# Patient Record
Sex: FEMALE | Race: WHITE | Employment: OTHER | URBAN - METROPOLITAN AREA
[De-identification: names, ages, dates, MRNs, and addresses within clinical notes are randomized per-mention and may not be internally consistent; named-entity substitution may affect disease eponyms.]

---

## 2017-06-02 ENCOUNTER — HOSPITAL ENCOUNTER (EMERGENCY)
Age: 77
Discharge: HOME | End: 2017-06-03
Attending: EMERGENCY MEDICINE
Payer: MEDICARE

## 2017-06-02 DIAGNOSIS — N39.0 URINARY TRACT INFECTION WITHOUT HEMATURIA, SITE UNSPECIFIED: ICD-10-CM

## 2017-06-02 DIAGNOSIS — M54.50 RIGHT-SIDED LOW BACK PAIN WITHOUT SCIATICA, UNSPECIFIED CHRONICITY: Primary | ICD-10-CM

## 2017-06-02 LAB
BASOPHILS # BLD AUTO: 0 K/UL (ref 0–0.1)
BASOPHILS # BLD: 0 % (ref 0–1)
EOSINOPHIL # BLD: 0.7 K/UL (ref 0–0.4)
EOSINOPHIL NFR BLD: 7 % (ref 0–7)
ERYTHROCYTE [DISTWIDTH] IN BLOOD BY AUTOMATED COUNT: 12.6 % (ref 11.5–14.5)
HCT VFR BLD AUTO: 38.8 % (ref 35–47)
HGB BLD-MCNC: 13.6 G/DL (ref 11.5–16)
LYMPHOCYTES # BLD AUTO: 19 % (ref 12–49)
LYMPHOCYTES # BLD: 2 K/UL (ref 0.8–3.5)
MCH RBC QN AUTO: 32.6 PG (ref 26–34)
MCHC RBC AUTO-ENTMCNC: 35.1 G/DL (ref 30–36.5)
MCV RBC AUTO: 93 FL (ref 80–99)
MONOCYTES # BLD: 0.7 K/UL (ref 0–1)
MONOCYTES NFR BLD AUTO: 7 % (ref 5–13)
NEUTS SEG # BLD: 7.1 K/UL (ref 1.8–8)
NEUTS SEG NFR BLD AUTO: 67 % (ref 32–75)
PLATELET # BLD AUTO: 319 K/UL (ref 150–400)
RBC # BLD AUTO: 4.17 M/UL (ref 3.8–5.2)
WBC # BLD AUTO: 10.5 K/UL (ref 3.6–11)

## 2017-06-02 PROCEDURE — 80053 COMPREHEN METABOLIC PANEL: CPT | Performed by: EMERGENCY MEDICINE

## 2017-06-02 PROCEDURE — 81001 URINALYSIS AUTO W/SCOPE: CPT | Performed by: EMERGENCY MEDICINE

## 2017-06-02 PROCEDURE — 87186 SC STD MICRODIL/AGAR DIL: CPT | Performed by: EMERGENCY MEDICINE

## 2017-06-02 PROCEDURE — 85025 COMPLETE CBC W/AUTO DIFF WBC: CPT | Performed by: EMERGENCY MEDICINE

## 2017-06-02 PROCEDURE — 99284 EMERGENCY DEPT VISIT MOD MDM: CPT

## 2017-06-02 PROCEDURE — 87086 URINE CULTURE/COLONY COUNT: CPT | Performed by: EMERGENCY MEDICINE

## 2017-06-02 PROCEDURE — 87077 CULTURE AEROBIC IDENTIFY: CPT | Performed by: EMERGENCY MEDICINE

## 2017-06-02 PROCEDURE — 36415 COLL VENOUS BLD VENIPUNCTURE: CPT | Performed by: EMERGENCY MEDICINE

## 2017-06-03 ENCOUNTER — APPOINTMENT (OUTPATIENT)
Dept: GENERAL RADIOLOGY | Age: 77
End: 2017-06-03
Attending: EMERGENCY MEDICINE
Payer: MEDICARE

## 2017-06-03 VITALS
DIASTOLIC BLOOD PRESSURE: 78 MMHG | HEART RATE: 67 BPM | OXYGEN SATURATION: 99 % | WEIGHT: 175 LBS | RESPIRATION RATE: 16 BRPM | SYSTOLIC BLOOD PRESSURE: 150 MMHG | HEIGHT: 65 IN | TEMPERATURE: 98.1 F | BODY MASS INDEX: 29.16 KG/M2

## 2017-06-03 LAB
ALBUMIN SERPL BCP-MCNC: 3.6 G/DL (ref 3.5–5)
ALBUMIN/GLOB SERPL: 0.9 {RATIO} (ref 1.1–2.2)
ALP SERPL-CCNC: 88 U/L (ref 45–117)
ALT SERPL-CCNC: 15 U/L (ref 12–78)
ANION GAP BLD CALC-SCNC: 7 MMOL/L (ref 5–15)
APPEARANCE UR: CLEAR
AST SERPL W P-5'-P-CCNC: 17 U/L (ref 15–37)
BACTERIA URNS QL MICRO: ABNORMAL /HPF
BILIRUB SERPL-MCNC: 0.3 MG/DL (ref 0.2–1)
BILIRUB UR QL: NEGATIVE
BUN SERPL-MCNC: 21 MG/DL (ref 6–20)
BUN/CREAT SERPL: 23 (ref 12–20)
CALCIUM SERPL-MCNC: 9.8 MG/DL (ref 8.5–10.1)
CHLORIDE SERPL-SCNC: 108 MMOL/L (ref 97–108)
CO2 SERPL-SCNC: 24 MMOL/L (ref 21–32)
COLOR UR: ABNORMAL
CREAT SERPL-MCNC: 0.9 MG/DL (ref 0.55–1.02)
EPITH CASTS URNS QL MICRO: ABNORMAL /LPF
GLOBULIN SER CALC-MCNC: 4 G/DL (ref 2–4)
GLUCOSE SERPL-MCNC: 101 MG/DL (ref 65–100)
GLUCOSE UR STRIP.AUTO-MCNC: NEGATIVE MG/DL
HGB UR QL STRIP: NEGATIVE
KETONES UR QL STRIP.AUTO: NEGATIVE MG/DL
LEUKOCYTE ESTERASE UR QL STRIP.AUTO: ABNORMAL
NITRITE UR QL STRIP.AUTO: NEGATIVE
PH UR STRIP: 6 [PH] (ref 5–8)
POTASSIUM SERPL-SCNC: 3.9 MMOL/L (ref 3.5–5.1)
PROT SERPL-MCNC: 7.6 G/DL (ref 6.4–8.2)
PROT UR STRIP-MCNC: NEGATIVE MG/DL
RBC #/AREA URNS HPF: ABNORMAL /HPF (ref 0–5)
SODIUM SERPL-SCNC: 139 MMOL/L (ref 136–145)
SP GR UR REFRACTOMETRY: 1.02 (ref 1–1.03)
UA: UC IF INDICATED,UAUC: ABNORMAL
UROBILINOGEN UR QL STRIP.AUTO: 0.2 EU/DL (ref 0.2–1)
WBC URNS QL MICRO: ABNORMAL /HPF (ref 0–4)

## 2017-06-03 PROCEDURE — 74000 XR ABD (KUB): CPT

## 2017-06-03 PROCEDURE — 72100 X-RAY EXAM L-S SPINE 2/3 VWS: CPT

## 2017-06-03 PROCEDURE — 74011250637 HC RX REV CODE- 250/637: Performed by: EMERGENCY MEDICINE

## 2017-06-03 RX ORDER — TRAMADOL HYDROCHLORIDE 50 MG/1
50 TABLET ORAL
Qty: 9 TAB | Refills: 0 | Status: SHIPPED | OUTPATIENT
Start: 2017-06-03 | End: 2017-10-24

## 2017-06-03 RX ORDER — CEPHALEXIN 250 MG/1
500 CAPSULE ORAL
Status: COMPLETED | OUTPATIENT
Start: 2017-06-03 | End: 2017-06-03

## 2017-06-03 RX ORDER — CEPHALEXIN 500 MG/1
500 CAPSULE ORAL 3 TIMES DAILY
Qty: 15 CAP | Refills: 0 | Status: SHIPPED | OUTPATIENT
Start: 2017-06-03 | End: 2017-07-15

## 2017-06-03 RX ADMIN — CEPHALEXIN 500 MG: 250 CAPSULE ORAL at 01:31

## 2017-06-03 NOTE — ED PROVIDER NOTES
HPI Comments: Ethan Nicholson is a 78 y.o. female with PMhx significant for anemia who presents via EMS to the ED with cc of right sided back pain. PTA. Pt reports she is asymptomatic while in ED. Of note, pt is a poor historian and states she unsure who called EMS or why. Per EMS, pt was moving her bed prior to onset of current symptoms. She states she was moving her belongings in her room, but is unsure what they were. Pt's daughter reports the pt called her and left a voicemail talking about baseball and sounded normal and asymptomatic. She states she then called ~5-8 minutes later c/o of back pain and abdominal pain. Pt states she was very hungry, as she had not eaten since 1200, contributing to her abdominal pain. Of note, daughter notes pt hx of constipation, however pt states she is not constipated and endorses taking Miralax. At present, pt denies any abdominal pain and is irritated about why she is even here and felt she needed no work-up. Daughter explained she called 911 because she was concerned because she couldn't get there to assist her and wanted to be safe. Pt denies CP, SOB, Cough, NVD, leg or arm weakness, numbness around her genital region, or bowel or bladder dysfunction, nor fevers or chills. PCP: None    There are no other complaints, changes or physical findings at this time. The history is provided by the patient, the EMS personnel and a relative. Past Medical History:   Diagnosis Date    Anemia     Transient global amnesia     dx 4 years ago. Past Surgical History:   Procedure Laterality Date    HX CATARACT REMOVAL      Bilateral    HX CHOLECYSTECTOMY      HX HYSTERECTOMY      HX KNEE ARTHROSCOPY      Right         No family history on file. Social History     Social History    Marital status:      Spouse name: N/A    Number of children: N/A    Years of education: N/A     Occupational History    Not on file.      Social History Main Topics    Smoking status: Never Smoker    Smokeless tobacco: Not on file    Alcohol use No    Drug use: Not on file    Sexual activity: Not on file     Other Topics Concern    Not on file     Social History Narrative         ALLERGIES: Review of patient's allergies indicates no known allergies. Review of Systems   Constitutional: Negative for activity change, appetite change, chills, fatigue and fever. HENT: Negative. Negative for congestion, rhinorrhea and sore throat. Respiratory: Negative. Negative for cough, chest tightness, shortness of breath and wheezing. Cardiovascular: Negative. Negative for chest pain and leg swelling. Gastrointestinal: Positive for abdominal pain (resolved). Negative for abdominal distention, constipation, diarrhea, nausea and vomiting. Endocrine: Negative. Genitourinary: Negative for difficulty urinating, dysuria, menstrual problem, vaginal bleeding and vaginal discharge. Musculoskeletal: Positive for back pain (right; resolved). Negative for arthralgias, joint swelling and myalgias. Skin: Negative. Negative for rash. Neurological: Negative. Negative for dizziness, weakness, light-headedness and headaches. Psychiatric/Behavioral: Positive for agitation. Patient Vitals for the past 12 hrs:   Temp Pulse Resp BP SpO2   06/03/17 0132 - 67 16 150/78 99 %   06/02/17 2230 - - - 156/84 99 %   06/02/17 2213 98.1 °F (36.7 °C) 70 18 (!) 154/101 97 %   06/02/17 2204 - - - - 97 %   06/02/17 2202 - - - (!) 154/101 -            Physical Exam   Constitutional: She appears well-developed and well-nourished. No distress. Stable vital signs; afebrile   HENT:   Head: Atraumatic. Eyes: EOM are normal.   Cardiovascular: Normal rate, regular rhythm, normal heart sounds and intact distal pulses. Exam reveals no gallop and no friction rub. No murmur heard. Pulmonary/Chest: Effort normal and breath sounds normal. No respiratory distress. She has no wheezes.  She has no rales. She exhibits no tenderness. Abdominal: Soft. Bowel sounds are normal. She exhibits no distension and no mass. There is no tenderness. There is no rebound and no guarding. Musculoskeletal: Normal range of motion. She exhibits tenderness. She exhibits no edema. Over right-sided paraspinal muscles in lower thoracic/upper lumbar region; no vertebral body ttp or step offs   Neurological:   At neurologic baseline, very irritable which was baseline per family   Skin: Skin is warm. Psychiatric:   Agitated, repeatedly telling us she didn't want to be evaluated or have labs done   Nursing note and vitals reviewed. MDM  Number of Diagnoses or Management Options  Right-sided low back pain without sciatica, unspecified chronicity:   Urinary tract infection without hematuria, site unspecified:   Diagnosis management comments: It took patient over one hour, and three separate visits into her room, for her       Amount and/or Complexity of Data Reviewed  Clinical lab tests: ordered and reviewed  Tests in the radiology section of CPT®: ordered and reviewed  Obtain history from someone other than the patient: yes (EMS  Daughter)  Review and summarize past medical records: yes  Independent visualization of images, tracings, or specimens: yes    Patient Progress  Patient progress: stable    ED Course       Procedures     PROGRESS NOTE:  1:15 AM  Pt updated on all results. She remains asymptomatic and ready to go home.   Written by Tracy Reyes ED Scribe, as dictated by Nicole Tejeda MD.    LABORATORY TESTS:  Recent Results (from the past 12 hour(s))   CBC WITH AUTOMATED DIFF    Collection Time: 06/02/17 11:34 PM   Result Value Ref Range    WBC 10.5 3.6 - 11.0 K/uL    RBC 4.17 3.80 - 5.20 M/uL    HGB 13.6 11.5 - 16.0 g/dL    HCT 38.8 35.0 - 47.0 %    MCV 93.0 80.0 - 99.0 FL    MCH 32.6 26.0 - 34.0 PG    MCHC 35.1 30.0 - 36.5 g/dL    RDW 12.6 11.5 - 14.5 %    PLATELET 394 371 - 167 K/uL    NEUTROPHILS 67 32 - 75 % LYMPHOCYTES 19 12 - 49 %    MONOCYTES 7 5 - 13 %    EOSINOPHILS 7 0 - 7 %    BASOPHILS 0 0 - 1 %    ABS. NEUTROPHILS 7.1 1.8 - 8.0 K/UL    ABS. LYMPHOCYTES 2.0 0.8 - 3.5 K/UL    ABS. MONOCYTES 0.7 0.0 - 1.0 K/UL    ABS. EOSINOPHILS 0.7 (H) 0.0 - 0.4 K/UL    ABS. BASOPHILS 0.0 0.0 - 0.1 K/UL   METABOLIC PANEL, COMPREHENSIVE    Collection Time: 06/02/17 11:34 PM   Result Value Ref Range    Sodium 139 136 - 145 mmol/L    Potassium 3.9 3.5 - 5.1 mmol/L    Chloride 108 97 - 108 mmol/L    CO2 24 21 - 32 mmol/L    Anion gap 7 5 - 15 mmol/L    Glucose 101 (H) 65 - 100 mg/dL    BUN 21 (H) 6 - 20 MG/DL    Creatinine 0.90 0.55 - 1.02 MG/DL    BUN/Creatinine ratio 23 (H) 12 - 20      GFR est AA >60 >60 ml/min/1.73m2    GFR est non-AA >60 >60 ml/min/1.73m2    Calcium 9.8 8.5 - 10.1 MG/DL    Bilirubin, total 0.3 0.2 - 1.0 MG/DL    ALT (SGPT) 15 12 - 78 U/L    AST (SGOT) 17 15 - 37 U/L    Alk. phosphatase 88 45 - 117 U/L    Protein, total 7.6 6.4 - 8.2 g/dL    Albumin 3.6 3.5 - 5.0 g/dL    Globulin 4.0 2.0 - 4.0 g/dL    A-G Ratio 0.9 (L) 1.1 - 2.2     URINALYSIS W/ REFLEX CULTURE    Collection Time: 06/02/17 11:42 PM   Result Value Ref Range    Color YELLOW/STRAW      Appearance CLEAR CLEAR      Specific gravity 1.016 1.003 - 1.030      pH (UA) 6.0 5.0 - 8.0      Protein NEGATIVE  NEG mg/dL    Glucose NEGATIVE  NEG mg/dL    Ketone NEGATIVE  NEG mg/dL    Bilirubin NEGATIVE  NEG      Blood NEGATIVE  NEG      Urobilinogen 0.2 0.2 - 1.0 EU/dL    Nitrites NEGATIVE  NEG      Leukocyte Esterase MODERATE (A) NEG      WBC 5-10 0 - 4 /hpf    RBC 0-5 0 - 5 /hpf    Epithelial cells FEW FEW /lpf    Bacteria 2+ (A) NEG /hpf    UA:UC IF INDICATED URINE CULTURE ORDERED (A) CNI         IMAGING RESULTS:  XR SPINE LUMB 2 OR 3 V   Final Result   EXAM: XR SPINE LUMB 2 OR 3 V     INDICATION: Back Pain     COMPARISON: None.     FINDINGS: AP, lateral and spot lateral views of the lumbar spine demonstrate  normal alignment.  Vertebral body height is preserved. There is degenerative  disc disease change mainly at L4-5. Loss of disc height and vacuum phenomenon  noted. There is probable degenerative change of facet joints from approximately  L3-S1. There is no fracture, subluxation or other abnormality.      IMPRESSION  IMPRESSION: Degenerative changes. XR ABD (KUB)   Final Result   Abdomen, single view     INDICATION: Abdominal pain     COMPARISON: 3/4/2016.     FINDINGS: A supine radiograph of the abdomen shows a normal bowel gas pattern. No soft tissue masses or pathologic calcifications are identified. The bones and  soft tissues are within normal limits.     IMPRESSION  IMPRESSION: Normal abdomen.          MEDICATIONS GIVEN:  Medications   cephALEXin (KEFLEX) capsule 500 mg (500 mg Oral Given 6/3/17 0131)       IMPRESSION:  1. Right-sided low back pain without sciatica, unspecified chronicity    2. Urinary tract infection without hematuria, site unspecified        PLAN:  1. Discharge home  Current Discharge Medication List      START taking these medications    Details   cephALEXin (KEFLEX) 500 mg capsule Take 1 Cap by mouth three (3) times daily. Qty: 15 Cap, Refills: 0      traMADol (ULTRAM) 50 mg tablet Take 1 Tab by mouth every eight (8) hours as needed for Pain. Max Daily Amount: 150 mg.  Qty: 9 Tab, Refills: 0           2. Follow-up Information     Follow up With Details Comments Contact Info    Deena Zabala MD Schedule an appointment as soon as possible for a visit in 1 week Establish care with a PCP for ongoing medical care [address] [place] [address]  [phone]      \[facility]\"" EMERGENCY DEPT  If symptoms worsen [address] [place] [address]  [phone]        Return to ED if worse     DISCHARGE NOTE:  1:32 AM  The patient is ready for discharge. The patients signs, symptoms, diagnosis, and instructions for discharge have been discussed and the pt has conveyed their understanding.  The patient is to follow up as recommended with PCP or return to the ER should their symptoms worsen. Plan has been discussed and patient has conveyed their agreement. This note is prepared by Ayad Briceno, acting as Scribe for Dayana Talbert MD.    Dayana Talbert MD: The scribe's documentation has been prepared under my direction and personally reviewed by me in its entirety. I confirm that the note above accurately reflects all work, treatment, procedures, and medical decision making performed by me.

## 2017-06-03 NOTE — ED NOTES
Assumed care of pt from Aurora Nava RN. Pt resting comfortably with side rails up and call bell in reach.

## 2017-06-03 NOTE — ED NOTES
Patient brought in by EMS. Per EMS, patient initially c/o abdominal pain to them, however she then denied abdomin pain and stated it was back pain. Daughter states that patient had called her and was talking about a ball game on tv and had no complaints. Daughter then states the patient called back 5 minutes after getting off the phone complaining of severe pain to the back and stomach. Patient states \"Well the stomach pain was because I needed to eat. I hadn't had anything today, well, except breakfast.\" Denies abdominal pain currently. Patient questioned regarding back pain. States located in the lower right back. Denied pain upon examination, however, later on in physical exam patient stated \"Oh there's my pain. \" Patient exhibits agitation and erratic behavior even with daughter present. Family denies mental health hx but states that in the 4 years since her transient global amnesia attack she seems to have more memory issues and agitation. Dr Colin Estevez updated. Resting in position of comfort with call bell in reach, side rails x 1 on left, family at bedside, bed low.

## 2017-06-03 NOTE — ED NOTES
Patient refused to speak to nurse due to speaking on the phone. Patient put hand up to stop nurse from speaking and continued to speak to person on the phone. Nurse left room.

## 2017-06-03 NOTE — ED NOTES
Patient came to door of room demanding drink of water. Informed patient she could not having a drink until the Dr sees her for testing purposes. Patient states Ronan Clark is ridiculous for me as I'm not one of those people who come in half dead. \" Informed patient we needed her to have a seat on the stretcher. Patient states \"Well I have trouble getting on it. \" Informed patient was was just sitting on it when she was on the phone. Patient stated \"No I was not. \"  Patient then stated \"Didn't anyone ever tell you be nice to your people even when they aren't being nice to you? \" Informed patient we were being nice to her and here to help but that we needed her to follow directions. Patient continued to speak to no one in particular about treatment and not wanting to be here tonight. Repositioned patients bed for comfort per request. When Ramy reeceCorona, placed BP cuff on patient, the patient stated \"I've already had this done tonight there is no reason for this. \" Educated patient that although EMS took her pressure we have to get our own while she is here. After blood pressure was done pt pointed to an area below the cuff and stated to tech \"Look, Look what you did to me! \" the area the patient pointed to had small light yellow bruising that appeared to be days old. Informed the patient the tech did not cause the bruising to the left arm to which the patient replied \"yes she did. She and that stupid cuff! \" Tech left the room. Dr Rodolfo Cheng came in to patients room to assess patient. Patient verbally rude and aggressive with Dr Rodolfo Cheng and this nurse. Patient stated she would not speak to us until we got her daughter in the room. Dr Rodolfo Cheng requested daughter to be brought back to the room and told the patient she would come back once the daughter was there.

## 2017-06-03 NOTE — ED NOTES
Discharge instructions given to patient and family by Fransico Malhotra MD. Pt and family verbalized understanding of discharge instructions. Pt discharged without difficulty. Pt discharged in stable condition via ambulation, accompanied by family.

## 2017-06-03 NOTE — DISCHARGE INSTRUCTIONS
Back Pain: Care Instructions  Your Care Instructions    Back pain has many possible causes. It is often related to problems with muscles and ligaments of the back. It may also be related to problems with the nerves, discs, or bones of the back. Moving, lifting, standing, sitting, or sleeping in an awkward way can strain the back. Sometimes you don't notice the injury until later. Arthritis is another common cause of back pain. Although it may hurt a lot, back pain usually improves on its own within several weeks. Most people recover in 12 weeks or less. Using good home treatment and being careful not to stress your back can help you feel better sooner. Follow-up care is a key part of your treatment and safety. Be sure to make and go to all appointments, and call your doctor if you are having problems. Its also a good idea to know your test results and keep a list of the medicines you take. How can you care for yourself at home? · Sit or lie in positions that are most comfortable and reduce your pain. Try one of these positions when you lie down:  ¨ Lie on your back with your knees bent and supported by large pillows. ¨ Lie on the floor with your legs on the seat of a sofa or chair. Sonya Dawley on your side with your knees and hips bent and a pillow between your legs. ¨ Lie on your stomach if it does not make pain worse. · Do not sit up in bed, and avoid soft couches and twisted positions. Bed rest can help relieve pain at first, but it delays healing. Avoid bed rest after the first day of back pain. · Change positions every 30 minutes. If you must sit for long periods of time, take breaks from sitting. Get up and walk around, or lie in a comfortable position. · Try using a heating pad on a low or medium setting for 15 to 20 minutes every 2 or 3 hours. Try a warm shower in place of one session with the heating pad. · You can also try an ice pack for 10 to 15 minutes every 2 to 3 hours.  Put a thin cloth between the ice pack and your skin. · Take pain medicines exactly as directed. ¨ If the doctor gave you a prescription medicine for pain, take it as prescribed. ¨ If you are not taking a prescription pain medicine, ask your doctor if you can take an over-the-counter medicine. · Take short walks several times a day. You can start with 5 to 10 minutes, 3 or 4 times a day, and work up to longer walks. Walk on level surfaces and avoid hills and stairs until your back is better. · Return to work and other activities as soon as you can. Continued rest without activity is usually not good for your back. · To prevent future back pain, do exercises to stretch and strengthen your back and stomach. Learn how to use good posture, safe lifting techniques, and proper body mechanics. When should you call for help? Call your doctor now or seek immediate medical care if:  · You have new or worsening numbness in your legs. · You have new or worsening weakness in your legs. (This could make it hard to stand up.)  · You lose control of your bladder or bowels. Watch closely for changes in your health, and be sure to contact your doctor if:  · Your pain gets worse. · You are not getting better after 2 weeks. Where can you learn more? Go to http://quinton-armen.info/. Enter T606 in the search box to learn more about \"Back Pain: Care Instructions. \"  Current as of: May 23, 2016  Content Version: 11.2  © 8242-8159 [employer]. Care instructions adapted under license by [employer] (which disclaims liability or warranty for this information). If you have questions about a medical condition or this instruction, always ask your healthcare professional. [address] any warranty or liability for your use of this information.

## 2017-06-05 LAB
BACTERIA SPEC CULT: ABNORMAL
BACTERIA SPEC CULT: ABNORMAL
CC UR VC: ABNORMAL
SERVICE CMNT-IMP: ABNORMAL

## 2017-07-15 ENCOUNTER — APPOINTMENT (OUTPATIENT)
Dept: CT IMAGING | Age: 77
End: 2017-07-15
Attending: EMERGENCY MEDICINE
Payer: MEDICARE

## 2017-07-15 ENCOUNTER — APPOINTMENT (OUTPATIENT)
Dept: GENERAL RADIOLOGY | Age: 77
End: 2017-07-15
Attending: EMERGENCY MEDICINE
Payer: MEDICARE

## 2017-07-15 ENCOUNTER — HOSPITAL ENCOUNTER (EMERGENCY)
Age: 77
Discharge: LEFT BEFORE DISCHARGE | End: 2017-07-15
Attending: EMERGENCY MEDICINE | Admitting: EMERGENCY MEDICINE
Payer: MEDICARE

## 2017-07-15 VITALS
TEMPERATURE: 97.5 F | WEIGHT: 151.46 LBS | BODY MASS INDEX: 25.2 KG/M2 | SYSTOLIC BLOOD PRESSURE: 168 MMHG | RESPIRATION RATE: 20 BRPM | HEART RATE: 88 BPM | OXYGEN SATURATION: 98 % | DIASTOLIC BLOOD PRESSURE: 73 MMHG

## 2017-07-15 DIAGNOSIS — R10.9 RIGHT FLANK PAIN: ICD-10-CM

## 2017-07-15 DIAGNOSIS — R10.84 ABDOMINAL PAIN, GENERALIZED: Primary | ICD-10-CM

## 2017-07-15 LAB
ALBUMIN SERPL BCP-MCNC: 3.7 G/DL (ref 3.5–5)
ALBUMIN/GLOB SERPL: 0.8 {RATIO} (ref 1.1–2.2)
ALP SERPL-CCNC: 93 U/L (ref 45–117)
ALT SERPL-CCNC: 16 U/L (ref 12–78)
ANION GAP BLD CALC-SCNC: 7 MMOL/L (ref 5–15)
APPEARANCE UR: CLEAR
AST SERPL W P-5'-P-CCNC: 18 U/L (ref 15–37)
BACTERIA URNS QL MICRO: NEGATIVE /HPF
BASOPHILS # BLD AUTO: 0 K/UL (ref 0–0.1)
BASOPHILS # BLD: 0 % (ref 0–1)
BILIRUB SERPL-MCNC: 0.4 MG/DL (ref 0.2–1)
BILIRUB UR QL: NEGATIVE
BUN SERPL-MCNC: 23 MG/DL (ref 6–20)
BUN/CREAT SERPL: 23 (ref 12–20)
CALCIUM SERPL-MCNC: 9.8 MG/DL (ref 8.5–10.1)
CHLORIDE SERPL-SCNC: 102 MMOL/L (ref 97–108)
CO2 SERPL-SCNC: 27 MMOL/L (ref 21–32)
COLOR UR: ABNORMAL
CREAT SERPL-MCNC: 0.98 MG/DL (ref 0.55–1.02)
EOSINOPHIL # BLD: 0.2 K/UL (ref 0–0.4)
EOSINOPHIL NFR BLD: 2 % (ref 0–7)
EPITH CASTS URNS QL MICRO: ABNORMAL /LPF
ERYTHROCYTE [DISTWIDTH] IN BLOOD BY AUTOMATED COUNT: 12.6 % (ref 11.5–14.5)
GLOBULIN SER CALC-MCNC: 4.4 G/DL (ref 2–4)
GLUCOSE SERPL-MCNC: 96 MG/DL (ref 65–100)
GLUCOSE UR STRIP.AUTO-MCNC: NEGATIVE MG/DL
HCT VFR BLD AUTO: 38.6 % (ref 35–47)
HGB BLD-MCNC: 13.5 G/DL (ref 11.5–16)
HGB UR QL STRIP: NEGATIVE
INR PPP: 1 (ref 0.9–1.1)
KETONES UR QL STRIP.AUTO: NEGATIVE MG/DL
LACTATE SERPL-SCNC: 1 MMOL/L (ref 0.4–2)
LEUKOCYTE ESTERASE UR QL STRIP.AUTO: ABNORMAL
LIPASE SERPL-CCNC: 188 U/L (ref 73–393)
LYMPHOCYTES # BLD AUTO: 19 % (ref 12–49)
LYMPHOCYTES # BLD: 2.2 K/UL (ref 0.8–3.5)
MCH RBC QN AUTO: 32.1 PG (ref 26–34)
MCHC RBC AUTO-ENTMCNC: 35 G/DL (ref 30–36.5)
MCV RBC AUTO: 91.7 FL (ref 80–99)
MONOCYTES # BLD: 0.8 K/UL (ref 0–1)
MONOCYTES NFR BLD AUTO: 7 % (ref 5–13)
NEUTS SEG # BLD: 8.2 K/UL (ref 1.8–8)
NEUTS SEG NFR BLD AUTO: 72 % (ref 32–75)
NITRITE UR QL STRIP.AUTO: NEGATIVE
PH UR STRIP: 6 [PH] (ref 5–8)
PLATELET # BLD AUTO: 314 K/UL (ref 150–400)
POTASSIUM SERPL-SCNC: 3.5 MMOL/L (ref 3.5–5.1)
PROT SERPL-MCNC: 8.1 G/DL (ref 6.4–8.2)
PROT UR STRIP-MCNC: NEGATIVE MG/DL
PROTHROMBIN TIME: 10 SEC (ref 9–11.1)
RBC # BLD AUTO: 4.21 M/UL (ref 3.8–5.2)
RBC #/AREA URNS HPF: ABNORMAL /HPF (ref 0–5)
SODIUM SERPL-SCNC: 136 MMOL/L (ref 136–145)
SP GR UR REFRACTOMETRY: 1.02 (ref 1–1.03)
UROBILINOGEN UR QL STRIP.AUTO: 0.2 EU/DL (ref 0.2–1)
WBC # BLD AUTO: 11.4 K/UL (ref 3.6–11)
WBC URNS QL MICRO: ABNORMAL /HPF (ref 0–4)

## 2017-07-15 PROCEDURE — 85025 COMPLETE CBC W/AUTO DIFF WBC: CPT | Performed by: EMERGENCY MEDICINE

## 2017-07-15 PROCEDURE — 99285 EMERGENCY DEPT VISIT HI MDM: CPT

## 2017-07-15 PROCEDURE — 80053 COMPREHEN METABOLIC PANEL: CPT | Performed by: EMERGENCY MEDICINE

## 2017-07-15 PROCEDURE — 72072 X-RAY EXAM THORAC SPINE 3VWS: CPT

## 2017-07-15 PROCEDURE — 83690 ASSAY OF LIPASE: CPT | Performed by: EMERGENCY MEDICINE

## 2017-07-15 PROCEDURE — 81001 URINALYSIS AUTO W/SCOPE: CPT | Performed by: EMERGENCY MEDICINE

## 2017-07-15 PROCEDURE — 85610 PROTHROMBIN TIME: CPT | Performed by: EMERGENCY MEDICINE

## 2017-07-15 PROCEDURE — 93005 ELECTROCARDIOGRAM TRACING: CPT

## 2017-07-15 PROCEDURE — 74011250637 HC RX REV CODE- 250/637: Performed by: EMERGENCY MEDICINE

## 2017-07-15 PROCEDURE — 74011250636 HC RX REV CODE- 250/636: Performed by: EMERGENCY MEDICINE

## 2017-07-15 PROCEDURE — 36415 COLL VENOUS BLD VENIPUNCTURE: CPT | Performed by: EMERGENCY MEDICINE

## 2017-07-15 PROCEDURE — 83605 ASSAY OF LACTIC ACID: CPT | Performed by: EMERGENCY MEDICINE

## 2017-07-15 RX ORDER — SODIUM CHLORIDE 9 MG/ML
50 INJECTION, SOLUTION INTRAVENOUS
Status: DISCONTINUED | OUTPATIENT
Start: 2017-07-15 | End: 2017-07-15 | Stop reason: HOSPADM

## 2017-07-15 RX ORDER — SODIUM CHLORIDE 0.9 % (FLUSH) 0.9 %
10 SYRINGE (ML) INJECTION
Status: DISCONTINUED | OUTPATIENT
Start: 2017-07-15 | End: 2017-07-15 | Stop reason: HOSPADM

## 2017-07-15 RX ORDER — ACETAMINOPHEN 325 MG/1
650 TABLET ORAL
Status: COMPLETED | OUTPATIENT
Start: 2017-07-15 | End: 2017-07-15

## 2017-07-15 RX ORDER — MORPHINE SULFATE 4 MG/ML
3 INJECTION, SOLUTION INTRAMUSCULAR; INTRAVENOUS
Status: DISCONTINUED | OUTPATIENT
Start: 2017-07-15 | End: 2017-07-15 | Stop reason: HOSPADM

## 2017-07-15 RX ORDER — SODIUM CHLORIDE 9 MG/ML
1000 INJECTION, SOLUTION INTRAVENOUS ONCE
Status: DISCONTINUED | OUTPATIENT
Start: 2017-07-15 | End: 2017-07-15 | Stop reason: HOSPADM

## 2017-07-15 RX ADMIN — ACETAMINOPHEN 650 MG: 325 TABLET, FILM COATED ORAL at 19:28

## 2017-07-15 NOTE — ED NOTES
Two attempts at IV made by this nurse, Dr Demetrius Harris notified of difficulty, ice pack placed on left hand for comfort

## 2017-07-15 NOTE — ED NOTES
Patient line infiltrated and and unable to obtain blood from line; MD and charge nurse notified. A verbal order was received to give morphine IM at this time and MD request to do another ultrasound.

## 2017-07-15 NOTE — ED TRIAGE NOTES
Patient arrives ambulatory to ED with son with complaint of abdominal pain that comes in \"rolling waves of pain in her stomach that move to the back. \" Per patient early today; happened a couple of days ago and resolved and then came back. Patient states some chest pain. Patient screaming with very little touch. Patient oriented to self and needs to be re-oriented situation, time, and place constantly and this is a norm per son. Per son patient's mother had rectal cancer and needed an ostomy and became very confused at the end of her life.

## 2017-07-15 NOTE — ED NOTES
Patient questioning why she has to have blood taken and tests done. Patient educated on need for results.

## 2017-07-15 NOTE — ED PROVIDER NOTES
HPI Comments: Julian Culp is a 78 y.o. female with PMHx significant for Anemia and transient global anemia presenting ambulatory to [id number] [place] [address] ED with c/o 8/10 lower abdominal pain that comes in waves and radiates to the right side of her lower back. The pt notes that she experienced similar pain a few days ago which subsided only to return again today. She reports that her pain is worse with eating. The pt's son informs that the pt is likely suffering from early signs of memory loss. He reports that the pt called her daughter this morning complaining of abdominal pain. He denies the pt experiencing nausea, vomiting, diarrhea,  LOC, fever, or dizziness. She denies any chest pain or SOB currently. Also denies dysuria or hematuria. PCP: None  PSHx: hysterectomy, cholecystectomy   Social History:  (-) Tobacco,   (-) EtOH,      (-) Drugs     There are no other complaints, changes, or physical findings at this time. The history is provided by the patient and a relative. Past Medical History:   Diagnosis Date    Anemia     Transient global amnesia     dx 4 years ago. Past Surgical History:   Procedure Laterality Date    HX CATARACT REMOVAL      Bilateral    HX CHOLECYSTECTOMY      HX HYSTERECTOMY      HX KNEE ARTHROSCOPY      Right         History reviewed. No pertinent family history. Social History     Social History    Marital status:      Spouse name: N/A    Number of children: N/A    Years of education: N/A     Occupational History    Not on file. Social History Main Topics    Smoking status: Never Smoker    Smokeless tobacco: Never Used    Alcohol use No    Drug use: No    Sexual activity: Not on file     Other Topics Concern    Not on file     Social History Narrative         ALLERGIES: Review of patient's allergies indicates no known allergies. Review of Systems   Constitutional: Negative for chills and fever. HENT: Negative for congestion.     Eyes: Negative for visual disturbance. Respiratory: Negative for chest tightness. Cardiovascular: Negative for chest pain and leg swelling. Gastrointestinal: Positive for abdominal pain. Negative for diarrhea, nausea and vomiting. Endocrine: Negative for polyuria. Genitourinary: Negative for dysuria, frequency and hematuria. Musculoskeletal: Positive for back pain. Negative for myalgias. Skin: Negative for color change. Allergic/Immunologic: Negative for immunocompromised state. Neurological: Negative for numbness. All other systems reviewed and are negative. Vitals:    07/15/17 1637 07/15/17 1740 07/15/17 1742 07/15/17 1903   BP: 120/83 168/73     Pulse: 88      Resp: 20      Temp: 97.5 °F (36.4 °C)      SpO2: 100%  100% 98%   Weight: 68.7 kg (151 lb 7.3 oz)               Physical Exam   Nursing note and vitals reviewed. Nursing note and vitals reviewed. General appearance: non-toxic, NAD  Eyes: PERRL, EOMI, conjunctiva normal, anicteric sclera  HEENT: mucous membranes moist, oropharynx is slightly dry  Pulmonary: clear to auscultation bilaterally  Cardiac: normal rate and regular rhythm, no murmurs, gallops, or rubs, 1+DP pulses, 2+ radial pulses  Abdomen: soft,  nondistended, bowel sounds present, non focal tenderness to palpation, no CVA TTP  MSK: no pre-tibial edema, extremities are warm and well perfused. TTP of mid t-spine (no step-offs or deformities noted)  Neuro: Alert, answers questions appropriately  Skin: capillary refill brisk, suspected lipoma appreciated over the left flank.         MDM  Number of Diagnoses or Management Options  Diagnosis management comments:   DDx: Renal Stone, UTI, pyelonephritis, Colitis, IBS, Appendicitis, Pancreatitis        Amount and/or Complexity of Data Reviewed  Tests in the radiology section of CPT®: ordered and reviewed  Tests in the medicine section of CPT®: ordered and reviewed  Review and summarize past medical records: yes  Independent visualization of images, tracings, or specimens: yes    Patient Progress  Patient progress: stable    ED Course       Procedures    EKG interpretation: (Preliminary) 16:47  Rhythm: normal sinus rhythm; and regular . Rate (approx.): 73; Axis: normal; IN interval: normal; QRS interval: normal ; ST/T wave: normal;  Other findings: no ST elevations or depressions. 6:28 PM  RN's have made 4 attempts to obtain IV access in the pt with no success. I will attempt to perform a US guided IV. Procedure Note- Peripheral IV Access  6:29 PM  Performed by: MD Ailyn Franklin MD gained IV access using  20 gauge needle because the patient had no vascular access. After cleaning the site with alcohol prep, the Right forearm vein was localized with ultrasound guidance in an anterior approach. Line confirmation was obtained by direct visualization and good blood return. No anaesthetic was used. The line was successfully flushed with normal saline and was secured with transparent tape. Estimated blood loss: none  The procedure took 1-15 minutes, and pt tolerated well. Written by SHERRI Hauser, as dictated by Ailyn Glover MD.    7:03 PM  Pt's IV has infiltrated. Will attempt another US guided IV. Procedure Note- Peripheral IV Access  7:41 PM  Performed by: . MD Amada Franklin IV, MD gained IV access using  20 gauge needle because the patient had no vascular access. After cleaning the site with alcohol prep, the Left antecubital vein was localized with ultrasound guidance in an anterior approach. Line confirmation was obtained by direct visualization and good blood return. No anaesthetic was used. The line was successfully flushed with normal saline and was secured with transparent tape. Estimated blood loss: none  The procedure took 1-15 minutes, and pt tolerated well.   Written by SHERRI Choudhury, as dictated by Ailyn Glover MD.    08:30 PM  Informed by RN that the pt has refused further care and has pulled out her IV access. She is refusing any further medical attention and states that she would like to leave now. 9:25 PM   Pt eloped w/o discharge instructions or discussion of available labs/images. I was unable to review the results of the work up thus far with the patient or her son. She had previously expressed a desire to leave as she no longer had any abdominal pain. 2 attempts at US-guided IV's were required. Blood work was obtained. Thoracic spine xray negative. Pt was noted to disagree with her son regarding how much discomfort she was experiencing earlier and her need to remain in the ED for further evaluation & testing. As the pt abruptly eloped, I was unable to discuss with her risks of incomplete evaluation and diagnosis. She appeared capacitated during initial evaluation, demonstrating some difficulty with short-term recall but otherwise mental status intact. There did not appear to be any reason to hold pt against her will.         LABORATORY TESTS:  Patient Vitals for the past 12 hrs:   Temp Pulse Resp BP SpO2   07/15/17 1903 - - - - 98 %   07/15/17 1742 - - - - 100 %   07/15/17 1740 - - - 168/73 -   07/15/17 1637 97.5 °F (36.4 °C) 88 20 120/83 100 %       Recent Results (from the past 24 hour(s))   EKG, 12 LEAD, INITIAL    Collection Time: 07/15/17  4:47 PM   Result Value Ref Range    Ventricular Rate 73 BPM    Atrial Rate 73 BPM    P-R Interval 170 ms    QRS Duration 80 ms    Q-T Interval 368 ms    QTC Calculation (Bezet) 405 ms    Calculated P Axis 69 degrees    Calculated R Axis 48 degrees    Calculated T Axis 57 degrees    Diagnosis       Normal sinus rhythm  Normal ECG  No previous ECGs available     URINALYSIS W/MICROSCOPIC    Collection Time: 07/15/17  5:23 PM   Result Value Ref Range    Color YELLOW/STRAW      Appearance CLEAR CLEAR      Specific gravity 1.021 1.003 - 1.030      pH (UA) 6.0 5.0 - 8.0      Protein NEGATIVE  NEG mg/dL    Glucose NEGATIVE  NEG mg/dL    Ketone NEGATIVE  NEG mg/dL    Bilirubin NEGATIVE  NEG      Blood NEGATIVE  NEG      Urobilinogen 0.2 0.2 - 1.0 EU/dL    Nitrites NEGATIVE  NEG      Leukocyte Esterase SMALL (A) NEG      WBC 0-4 0 - 4 /hpf    RBC 0-5 0 - 5 /hpf    Epithelial cells FEW FEW /lpf    Bacteria NEGATIVE  NEG /hpf   CBC WITH AUTOMATED DIFF    Collection Time: 07/15/17  7:39 PM   Result Value Ref Range    WBC 11.4 (H) 3.6 - 11.0 K/uL    RBC 4.21 3.80 - 5.20 M/uL    HGB 13.5 11.5 - 16.0 g/dL    HCT 38.6 35.0 - 47.0 %    MCV 91.7 80.0 - 99.0 FL    MCH 32.1 26.0 - 34.0 PG    MCHC 35.0 30.0 - 36.5 g/dL    RDW 12.6 11.5 - 14.5 %    PLATELET 591 329 - 675 K/uL    NEUTROPHILS 72 32 - 75 %    LYMPHOCYTES 19 12 - 49 %    MONOCYTES 7 5 - 13 %    EOSINOPHILS 2 0 - 7 %    BASOPHILS 0 0 - 1 %    ABS. NEUTROPHILS 8.2 (H) 1.8 - 8.0 K/UL    ABS. LYMPHOCYTES 2.2 0.8 - 3.5 K/UL    ABS. MONOCYTES 0.8 0.0 - 1.0 K/UL    ABS. EOSINOPHILS 0.2 0.0 - 0.4 K/UL    ABS. BASOPHILS 0.0 0.0 - 0.1 K/UL   LIPASE    Collection Time: 07/15/17  7:39 PM   Result Value Ref Range    Lipase 188 73 - 852 U/L   METABOLIC PANEL, COMPREHENSIVE    Collection Time: 07/15/17  7:39 PM   Result Value Ref Range    Sodium 136 136 - 145 mmol/L    Potassium 3.5 3.5 - 5.1 mmol/L    Chloride 102 97 - 108 mmol/L    CO2 27 21 - 32 mmol/L    Anion gap 7 5 - 15 mmol/L    Glucose 96 65 - 100 mg/dL    BUN 23 (H) 6 - 20 MG/DL    Creatinine 0.98 0.55 - 1.02 MG/DL    BUN/Creatinine ratio 23 (H) 12 - 20      GFR est AA >60 >60 ml/min/1.73m2    GFR est non-AA 55 (L) >60 ml/min/1.73m2    Calcium 9.8 8.5 - 10.1 MG/DL    Bilirubin, total 0.4 0.2 - 1.0 MG/DL    ALT (SGPT) 16 12 - 78 U/L    AST (SGOT) 18 15 - 37 U/L    Alk.  phosphatase 93 45 - 117 U/L    Protein, total 8.1 6.4 - 8.2 g/dL    Albumin 3.7 3.5 - 5.0 g/dL    Globulin 4.4 (H) 2.0 - 4.0 g/dL    A-G Ratio 0.8 (L) 1.1 - 2.2     PROTHROMBIN TIME + INR    Collection Time: 07/15/17  7:39 PM   Result Value Ref Range    INR 1.0 0.9 - 1.1      Prothrombin time 10.0 9.0 - 11.1 sec   LACTIC ACID    Collection Time: 07/15/17  7:39 PM   Result Value Ref Range    Lactic acid 1.0 0.4 - 2.0 MMOL/L         IMAGING RESULTS:  XR SPINE THORAC 3 V   Final Result      EXAM:  XR SPINE THORAC 3 V     INDICATION:   mid-T spine tenderness     COMPARISON: None.     FINDINGS: AP, lateral and swimmers lateral views of the thoracic spine  demonstrate normal alignment. No fracture or subluxation is identified.     IMPRESSION  IMPRESSION:  Normal thoracic spine. MEDICATIONS GIVEN:  Medications   0.9% sodium chloride infusion 1,000 mL (not administered)   morphine injection 3 mg ( IntraVENous Refused 7/15/17 1930)   0.9% sodium chloride infusion (not administered)   iopamidol (ISOVUE-370) 76 % injection 100 mL (not administered)   sodium chloride (NS) flush 10 mL (not administered)   acetaminophen (TYLENOL) tablet 650 mg (650 mg Oral Given 7/15/17 1928)       IMPRESSION:  1. Abdominal pain, generalized    2. Right flank pain        PLAN:  1. Pt eloped prior to re-evaluation or discussion of available lab results. No discharge instructions could be given due to pt leaving prior to discussion.

## 2017-07-15 NOTE — ED NOTES
Attempted to stick patient 2 times to start IV' first IV blew, second IV was unsuccessful. Charge nurse and MD notifed at this time. Orders received to give 16 oz orally for CT by MD at this time.

## 2017-07-15 NOTE — ED NOTES
Patient refusing morphine at this time. Patient requesting an oral pain medication. MD notified and will order tylenol.

## 2017-07-15 NOTE — ED NOTES
Dr. Diane Bell at bedside to perform another ultrasound guided IV. IV attempt successful; blood drawn and sent to lab at this time. Patient restless and confused.

## 2017-07-16 LAB
ATRIAL RATE: 73 BPM
CALCULATED P AXIS, ECG09: 69 DEGREES
CALCULATED R AXIS, ECG10: 48 DEGREES
CALCULATED T AXIS, ECG11: 57 DEGREES
DIAGNOSIS, 93000: NORMAL
P-R INTERVAL, ECG05: 170 MS
Q-T INTERVAL, ECG07: 368 MS
QRS DURATION, ECG06: 80 MS
QTC CALCULATION (BEZET), ECG08: 405 MS
VENTRICULAR RATE, ECG03: 73 BPM

## 2017-07-16 NOTE — ED NOTES
Attempted to contact pt at home number to check on her. Pt eloped yesterday with family and was unable to discuss results with pt prior to her elopement. Available number busy, unable to leave message.

## 2017-07-16 NOTE — ED NOTES
Patient getting dressed and attempting to leave at this time; stating she did not want to come to the hospital and why questioning why we need to do testing at this time. Patient educated and re-oriented to environment. Patient refusing still and pulled out IV line. MD made aware. MD requested what we could do to keep her here to help best provide care for the patient and patient still declined. Charge nurse made award and made attempt to help educate and re-orient patient, patient still refusing. Patient stable, at baseline; only \"angry as she sometimes gets, when she acts like a child\" per son. Patient ambulatory and stable out of ED with son.  Patient left without signing AMA paperwork and not provided discharge paperwork by MD.

## 2017-09-22 ENCOUNTER — HOSPITAL ENCOUNTER (EMERGENCY)
Age: 77
Discharge: HOME | End: 2017-09-22
Attending: EMERGENCY MEDICINE
Payer: MEDICARE

## 2017-09-22 ENCOUNTER — APPOINTMENT (OUTPATIENT)
Dept: CT IMAGING | Age: 77
End: 2017-09-22
Attending: EMERGENCY MEDICINE
Payer: MEDICARE

## 2017-09-22 VITALS
RESPIRATION RATE: 20 BRPM | TEMPERATURE: 97.4 F | WEIGHT: 153.44 LBS | OXYGEN SATURATION: 99 % | DIASTOLIC BLOOD PRESSURE: 78 MMHG | BODY MASS INDEX: 24.66 KG/M2 | HEIGHT: 66 IN | HEART RATE: 58 BPM | SYSTOLIC BLOOD PRESSURE: 140 MMHG

## 2017-09-22 DIAGNOSIS — R10.30 LOWER ABDOMINAL PAIN: Primary | ICD-10-CM

## 2017-09-22 LAB
ALBUMIN SERPL-MCNC: 3.2 G/DL (ref 3.5–5)
ALBUMIN/GLOB SERPL: 0.9 {RATIO} (ref 1.1–2.2)
ALP SERPL-CCNC: 68 U/L (ref 45–117)
ALT SERPL-CCNC: 14 U/L (ref 12–78)
ANION GAP SERPL CALC-SCNC: 10 MMOL/L (ref 5–15)
APPEARANCE UR: CLEAR
AST SERPL-CCNC: 16 U/L (ref 15–37)
BACTERIA URNS QL MICRO: NEGATIVE /HPF
BASOPHILS # BLD: 0 K/UL (ref 0–0.1)
BASOPHILS NFR BLD: 0 % (ref 0–1)
BILIRUB SERPL-MCNC: 0.2 MG/DL (ref 0.2–1)
BILIRUB UR QL: NEGATIVE
BUN SERPL-MCNC: 24 MG/DL (ref 6–20)
BUN/CREAT SERPL: 24 (ref 12–20)
CALCIUM SERPL-MCNC: 8.9 MG/DL (ref 8.5–10.1)
CHLORIDE SERPL-SCNC: 108 MMOL/L (ref 97–108)
CO2 SERPL-SCNC: 20 MMOL/L (ref 21–32)
COLOR UR: ABNORMAL
CREAT SERPL-MCNC: 1.01 MG/DL (ref 0.55–1.02)
EOSINOPHIL # BLD: 0.8 K/UL (ref 0–0.4)
EOSINOPHIL NFR BLD: 8 % (ref 0–7)
EPITH CASTS URNS QL MICRO: ABNORMAL /LPF
ERYTHROCYTE [DISTWIDTH] IN BLOOD BY AUTOMATED COUNT: 13.3 % (ref 11.5–14.5)
GLOBULIN SER CALC-MCNC: 3.6 G/DL (ref 2–4)
GLUCOSE SERPL-MCNC: 103 MG/DL (ref 65–100)
GLUCOSE UR STRIP.AUTO-MCNC: NEGATIVE MG/DL
HCT VFR BLD AUTO: 35.3 % (ref 35–47)
HGB BLD-MCNC: 11.9 G/DL (ref 11.5–16)
HGB UR QL STRIP: NEGATIVE
HYALINE CASTS URNS QL MICRO: ABNORMAL /LPF (ref 0–5)
KETONES UR QL STRIP.AUTO: NEGATIVE MG/DL
LACTATE SERPL-SCNC: 2 MMOL/L (ref 0.4–2)
LEUKOCYTE ESTERASE UR QL STRIP.AUTO: ABNORMAL
LIPASE SERPL-CCNC: 272 U/L (ref 73–393)
LYMPHOCYTES # BLD: 2.1 K/UL (ref 0.8–3.5)
LYMPHOCYTES NFR BLD: 23 % (ref 12–49)
MCH RBC QN AUTO: 30.7 PG (ref 26–34)
MCHC RBC AUTO-ENTMCNC: 33.7 G/DL (ref 30–36.5)
MCV RBC AUTO: 91.2 FL (ref 80–99)
MONOCYTES # BLD: 0.6 K/UL (ref 0–1)
MONOCYTES NFR BLD: 7 % (ref 5–13)
NEUTS SEG # BLD: 5.6 K/UL (ref 1.8–8)
NEUTS SEG NFR BLD: 62 % (ref 32–75)
NITRITE UR QL STRIP.AUTO: NEGATIVE
PH UR STRIP: 7 [PH] (ref 5–8)
PLATELET # BLD AUTO: 317 K/UL (ref 150–400)
POTASSIUM SERPL-SCNC: 3.7 MMOL/L (ref 3.5–5.1)
PROT SERPL-MCNC: 6.8 G/DL (ref 6.4–8.2)
PROT UR STRIP-MCNC: NEGATIVE MG/DL
RBC # BLD AUTO: 3.87 M/UL (ref 3.8–5.2)
RBC #/AREA URNS HPF: ABNORMAL /HPF (ref 0–5)
SODIUM SERPL-SCNC: 138 MMOL/L (ref 136–145)
SP GR UR REFRACTOMETRY: 1.01 (ref 1–1.03)
UA: UC IF INDICATED,UAUC: ABNORMAL
UROBILINOGEN UR QL STRIP.AUTO: 0.2 EU/DL (ref 0.2–1)
WBC # BLD AUTO: 9.1 K/UL (ref 3.6–11)
WBC URNS QL MICRO: ABNORMAL /HPF (ref 0–4)

## 2017-09-22 PROCEDURE — 80053 COMPREHEN METABOLIC PANEL: CPT | Performed by: EMERGENCY MEDICINE

## 2017-09-22 PROCEDURE — 36415 COLL VENOUS BLD VENIPUNCTURE: CPT | Performed by: EMERGENCY MEDICINE

## 2017-09-22 PROCEDURE — 83690 ASSAY OF LIPASE: CPT | Performed by: EMERGENCY MEDICINE

## 2017-09-22 PROCEDURE — 99283 EMERGENCY DEPT VISIT LOW MDM: CPT

## 2017-09-22 PROCEDURE — 74177 CT ABD & PELVIS W/CONTRAST: CPT

## 2017-09-22 PROCEDURE — 74011250636 HC RX REV CODE- 250/636: Performed by: EMERGENCY MEDICINE

## 2017-09-22 PROCEDURE — 81001 URINALYSIS AUTO W/SCOPE: CPT | Performed by: EMERGENCY MEDICINE

## 2017-09-22 PROCEDURE — 83605 ASSAY OF LACTIC ACID: CPT | Performed by: EMERGENCY MEDICINE

## 2017-09-22 PROCEDURE — 74011636320 HC RX REV CODE- 636/320: Performed by: EMERGENCY MEDICINE

## 2017-09-22 PROCEDURE — 85025 COMPLETE CBC W/AUTO DIFF WBC: CPT | Performed by: EMERGENCY MEDICINE

## 2017-09-22 RX ORDER — SODIUM CHLORIDE 9 MG/ML
50 INJECTION, SOLUTION INTRAVENOUS
Status: COMPLETED | OUTPATIENT
Start: 2017-09-22 | End: 2017-09-22

## 2017-09-22 RX ORDER — SODIUM CHLORIDE 0.9 % (FLUSH) 0.9 %
10 SYRINGE (ML) INJECTION
Status: COMPLETED | OUTPATIENT
Start: 2017-09-22 | End: 2017-09-22

## 2017-09-22 RX ADMIN — SODIUM CHLORIDE 50 ML/HR: 900 INJECTION, SOLUTION INTRAVENOUS at 06:03

## 2017-09-22 RX ADMIN — Medication 10 ML: at 06:03

## 2017-09-22 RX ADMIN — IOPAMIDOL 100 ML: 755 INJECTION, SOLUTION INTRAVENOUS at 06:03

## 2017-09-22 NOTE — DISCHARGE INSTRUCTIONS

## 2017-09-22 NOTE — ED PROVIDER NOTES
HPI Comments: Caio Decker is a 78 y.o. female with PShx significant for cholecystectomy and hysterectomy who presents ambulatory to the ED with c/o lower abd pain that began at [facility]. She also reports 1 episode of vomiting PTA. Pt states she is not currently nauseated or experiencing abdominal pain. She denies hematemesis or hematochezia. Pt denies CP, SOB, diarrhea, cough, fever, or chills. She denies hx of HTN or DM. PCP: None    Social history significant for: - Tobacco, - EtOH, - Illicit Drug Use    There are no other complaints, changes, or physical findings at this time. The history is provided by the patient and a relative. No  was used. Past Medical History:   Diagnosis Date    Anemia     Transient global amnesia     dx 4 years ago. Past Surgical History:   Procedure Laterality Date    HX CATARACT REMOVAL      Bilateral    HX CHOLECYSTECTOMY      HX HYSTERECTOMY      HX KNEE ARTHROSCOPY      Right         No family history on file. Social History     Social History    Marital status:      Spouse name: N/A    Number of children: N/A    Years of education: N/A     Occupational History    Not on file. Social History Main Topics    Smoking status: Never Smoker    Smokeless tobacco: Never Used    Alcohol use No    Drug use: No    Sexual activity: Not on file     Other Topics Concern    Not on file     Social History Narrative         ALLERGIES: Review of patient's allergies indicates no known allergies. Review of Systems   Constitutional: Negative for chills, fatigue and fever. HENT: Negative for congestion, rhinorrhea and sore throat. Eyes: Negative for pain, discharge and visual disturbance. Respiratory: Negative for cough, chest tightness, shortness of breath and wheezing. Cardiovascular: Negative for chest pain, palpitations and leg swelling.    Gastrointestinal: Positive for abdominal pain (lower abd), nausea (resolved) and vomiting (1 episode). Negative for blood in stool, constipation and diarrhea. Negative for hematemesis   Genitourinary: Negative for dysuria, frequency and hematuria. Musculoskeletal: Negative for arthralgias, back pain and myalgias. Skin: Negative for rash. Neurological: Negative for dizziness, weakness, light-headedness and headaches. Psychiatric/Behavioral: Negative. Patient Vitals for the past 12 hrs:   Temp Pulse Resp BP SpO2   09/22/17 0430 - - - 140/78 99 %   09/22/17 0425 - - - 124/79 -   09/22/17 0423 97.4 °F (36.3 °C) (!) 58 20 124/79 100 %       Physical Exam   Constitutional: She is oriented to person, place, and time. She appears well-developed and well-nourished. No distress. HENT:   Head: Normocephalic and atraumatic. Eyes: EOM are normal. Right eye exhibits no discharge. Left eye exhibits no discharge. No scleral icterus. Neck: Normal range of motion. Neck supple. No tracheal deviation present. Cardiovascular: Normal rate, regular rhythm, normal heart sounds and intact distal pulses. Exam reveals no gallop and no friction rub. No murmur heard. Pulmonary/Chest: Effort normal and breath sounds normal. No respiratory distress. She has no wheezes. She has no rales. Abdominal: Soft. She exhibits no distension. There is tenderness (diffuse). There is no rebound and no guarding. Musculoskeletal: Normal range of motion. She exhibits no edema. Lymphadenopathy:     She has no cervical adenopathy. Neurological: She is alert and oriented to person, place, and time. No focal neuro deficits   Skin: Skin is warm and dry. No rash noted. Psychiatric: She has a normal mood and affect. Nursing note and vitals reviewed. MDM  Number of Diagnoses or Management Options  Lower abdominal pain:   Diagnosis management comments:     Patient is a 79 yo female who presents to ED with lower abdominal pain and n/v since this morning.   She states abdominal pain has resolved but abdomen is tender on exam.  Differential includes viral syndrome, UTI, nephrolithiasis, appendicitis, choledocholithiasis, gastritis, GERD, PUD, ischemia, AAA  - CBC, CMP, lipase, lactate, UA  - CT a/p  - Symptomatic management and reevaluate         Amount and/or Complexity of Data Reviewed  Clinical lab tests: ordered and reviewed  Tests in the radiology section of CPT®: ordered and reviewed  Review and summarize past medical records: yes    Patient Progress  Patient progress: stable    ED Course       Procedures     PROGRESS NOTE:  6:45 AM  Labs and CT a/p unremarkable. Patient states she is feeling better, tolerating po. Discussed results, prescriptions and follow up plan with patient. Provided customary return to ED instructions. Patient expressed understanding. Shelbi Lopez MD    LABORATORY TESTS:  Recent Results (from the past 12 hour(s))   CBC WITH AUTOMATED DIFF    Collection Time: 09/22/17  4:56 AM   Result Value Ref Range    WBC 9.1 3.6 - 11.0 K/uL    RBC 3.87 3.80 - 5.20 M/uL    HGB 11.9 11.5 - 16.0 g/dL    HCT 35.3 35.0 - 47.0 %    MCV 91.2 80.0 - 99.0 FL    MCH 30.7 26.0 - 34.0 PG    MCHC 33.7 30.0 - 36.5 g/dL    RDW 13.3 11.5 - 14.5 %    PLATELET 038 115 - 819 K/uL    NEUTROPHILS 62 32 - 75 %    LYMPHOCYTES 23 12 - 49 %    MONOCYTES 7 5 - 13 %    EOSINOPHILS 8 (H) 0 - 7 %    BASOPHILS 0 0 - 1 %    ABS. NEUTROPHILS 5.6 1.8 - 8.0 K/UL    ABS. LYMPHOCYTES 2.1 0.8 - 3.5 K/UL    ABS. MONOCYTES 0.6 0.0 - 1.0 K/UL    ABS. EOSINOPHILS 0.8 (H) 0.0 - 0.4 K/UL    ABS.  BASOPHILS 0.0 0.0 - 0.1 K/UL   METABOLIC PANEL, COMPREHENSIVE    Collection Time: 09/22/17  4:56 AM   Result Value Ref Range    Sodium 138 136 - 145 mmol/L    Potassium 3.7 3.5 - 5.1 mmol/L    Chloride 108 97 - 108 mmol/L    CO2 20 (L) 21 - 32 mmol/L    Anion gap 10 5 - 15 mmol/L    Glucose 103 (H) 65 - 100 mg/dL    BUN 24 (H) 6 - 20 MG/DL    Creatinine 1.01 0.55 - 1.02 MG/DL    BUN/Creatinine ratio 24 (H) 12 - 20      GFR est AA >60 >60 ml/min/1.73m2    GFR est non-AA 53 (L) >60 ml/min/1.73m2    Calcium 8.9 8.5 - 10.1 MG/DL    Bilirubin, total 0.2 0.2 - 1.0 MG/DL    ALT (SGPT) 14 12 - 78 U/L    AST (SGOT) 16 15 - 37 U/L    Alk. phosphatase 68 45 - 117 U/L    Protein, total 6.8 6.4 - 8.2 g/dL    Albumin 3.2 (L) 3.5 - 5.0 g/dL    Globulin 3.6 2.0 - 4.0 g/dL    A-G Ratio 0.9 (L) 1.1 - 2.2     LIPASE    Collection Time: 09/22/17  4:56 AM   Result Value Ref Range    Lipase 272 73 - 393 U/L   LACTIC ACID    Collection Time: 09/22/17  4:56 AM   Result Value Ref Range    Lactic acid 2.0 0.4 - 2.0 MMOL/L   URINALYSIS W/ REFLEX CULTURE    Collection Time: 09/22/17  6:05 AM   Result Value Ref Range    Color YELLOW/STRAW      Appearance CLEAR CLEAR      Specific gravity 1.015 1.003 - 1.030      pH (UA) 7.0 5.0 - 8.0      Protein NEGATIVE  NEG mg/dL    Glucose NEGATIVE  NEG mg/dL    Ketone NEGATIVE  NEG mg/dL    Bilirubin NEGATIVE  NEG      Blood NEGATIVE  NEG      Urobilinogen 0.2 0.2 - 1.0 EU/dL    Nitrites NEGATIVE  NEG      Leukocyte Esterase SMALL (A) NEG      WBC 0-4 0 - 4 /hpf    RBC 0-5 0 - 5 /hpf    Epithelial cells FEW FEW /lpf    Bacteria NEGATIVE  NEG /hpf    UA:UC IF INDICATED CULTURE NOT INDICATED BY UA RESULT CNI      Hyaline cast 0-2 0 - 5 /lpf       IMAGING RESULTS:  CT Results  (Last 48 hours)               09/22/17 0611  CT ABD PELV W CONT Final result    Impression:  IMPRESSION:   Apparent wall thickening in the gastric antrum could be secondary to   underdistention; correlate with clinical findings; further evaluation could be   made with upper GI series or upper endoscopy, if clinically indicated. Normal   appendix. Unchanged hepatic cysts. Narrative:  EXAM:  CT ABD PELV W CONT       INDICATION: diffuse lower abdominal pain for 3 hours       COMPARISON: November 25, 2013       CONTRAST:  100 mL of Isovue-370.        TECHNIQUE:    Following the uneventful intravenous administration of contrast, thin axial   images were obtained through the abdomen and pelvis. Coronal and sagittal   reconstructions were generated. Oral contrast was not administered. CT dose   reduction was achieved through use of a standardized protocol tailored for this   examination and automatic exposure control for dose modulation. FINDINGS:    LUNG BASES: Clear. INCIDENTALLY IMAGED HEART AND MEDIASTINUM: Unremarkable. LIVER: Numerous small hepatic cysts are unchanged. No suspicious hepatic mass. GALLBLADDER: Surgically absent. SPLEEN: No mass. PANCREAS: No mass or ductal dilatation. ADRENALS: Unremarkable. KIDNEYS: No mass, calculus, or hydronephrosis. STOMACH: Incompletely distended. Apparent wall thickening in the gastric   antrum. .   SMALL BOWEL: No dilatation or wall thickening. COLON: No dilatation or wall thickening. APPENDIX: Normal.   PERITONEUM: No ascites or pneumoperitoneum. RETROPERITONEUM: No lymphadenopathy or aortic aneurysm. REPRODUCTIVE ORGANS: Status post hysterectomy. URINARY BLADDER: No mass or calculus. BONES: No destructive bone lesion. Degenerative disc disease at L4-5. ADDITIONAL COMMENTS: N/A               MEDICATIONS GIVEN:  Medications   sodium chloride (NS) flush 10 mL (10 mL IntraVENous Given 9/22/17 0603)   iopamidol (ISOVUE-370) 76 % injection 100 mL (100 mL IntraVENous Given 9/22/17 0603)   0.9% sodium chloride infusion (50 mL/hr IntraVENous New Bag 9/22/17 0603)       IMPRESSION:  1. Lower abdominal pain        PLAN:  1. Discharge Medication List as of 9/22/2017  6:46 AM        2. Follow-up Information     Follow up With Details Comments Contact Info    None In 3 days Please follow up with primary care provider None [phone] Patient stated that they have no PCP      [facility]  As needed, If symptoms worsen [address] [place] [address]  [phone]        Return to ED if worse     Discharge Note:  6:46 AM  The pt is ready for discharge.  The pt's signs, symptoms, diagnosis, and discharge instructions have been discussed and pt has conveyed their understanding. The pt is to follow up as recommended or return to ER should their symptoms worsen. Plan has been discussed and pt is in agreement. This note is prepared by Renuka Davey, acting as a Scribe for Ekta Goodwin MD.    Ekta Goodwin MD: The scribe's documentation has been prepared under my direction and personally reviewed by me in its entirety. I confirm that the notes above accurately reflects all work, treatment, procedures, and medical decision making performed by me.

## 2017-10-24 ENCOUNTER — APPOINTMENT (OUTPATIENT)
Dept: CT IMAGING | Age: 77
End: 2017-10-24
Attending: EMERGENCY MEDICINE
Payer: SELF-PAY

## 2017-10-24 ENCOUNTER — HOSPITAL ENCOUNTER (EMERGENCY)
Age: 77
Discharge: HOME | End: 2017-10-24
Attending: EMERGENCY MEDICINE
Payer: SELF-PAY

## 2017-10-24 VITALS
HEART RATE: 77 BPM | DIASTOLIC BLOOD PRESSURE: 99 MMHG | SYSTOLIC BLOOD PRESSURE: 180 MMHG | RESPIRATION RATE: 18 BRPM | BODY MASS INDEX: 25.49 KG/M2 | OXYGEN SATURATION: 99 % | WEIGHT: 153 LBS | TEMPERATURE: 97.5 F | HEIGHT: 65 IN

## 2017-10-24 DIAGNOSIS — N39.0 URINARY TRACT INFECTION WITHOUT HEMATURIA, SITE UNSPECIFIED: ICD-10-CM

## 2017-10-24 DIAGNOSIS — R10.84 ABDOMINAL PAIN, GENERALIZED: Primary | ICD-10-CM

## 2017-10-24 DIAGNOSIS — E87.6 HYPOKALEMIA: ICD-10-CM

## 2017-10-24 LAB
ALBUMIN SERPL-MCNC: 3.6 G/DL (ref 3.5–5)
ALBUMIN/GLOB SERPL: 0.9 {RATIO} (ref 1.1–2.2)
ALP SERPL-CCNC: 79 U/L (ref 45–117)
ALT SERPL-CCNC: 20 U/L (ref 12–78)
ANION GAP SERPL CALC-SCNC: 10 MMOL/L (ref 5–15)
APPEARANCE UR: ABNORMAL
AST SERPL-CCNC: 25 U/L (ref 15–37)
BACTERIA URNS QL MICRO: ABNORMAL /HPF
BASOPHILS # BLD: 0 K/UL (ref 0–0.1)
BASOPHILS NFR BLD: 0 % (ref 0–1)
BILIRUB SERPL-MCNC: 0.3 MG/DL (ref 0.2–1)
BILIRUB UR QL: NEGATIVE
BUN SERPL-MCNC: 12 MG/DL (ref 6–20)
BUN/CREAT SERPL: 10 (ref 12–20)
CALCIUM SERPL-MCNC: 10 MG/DL (ref 8.5–10.1)
CHLORIDE SERPL-SCNC: 102 MMOL/L (ref 97–108)
CO2 SERPL-SCNC: 22 MMOL/L (ref 21–32)
COLOR UR: ABNORMAL
CREAT SERPL-MCNC: 1.2 MG/DL (ref 0.55–1.02)
EOSINOPHIL # BLD: 0.4 K/UL (ref 0–0.4)
EOSINOPHIL NFR BLD: 5 % (ref 0–7)
EPITH CASTS URNS QL MICRO: ABNORMAL /LPF
ERYTHROCYTE [DISTWIDTH] IN BLOOD BY AUTOMATED COUNT: 12.8 % (ref 11.5–14.5)
GLOBULIN SER CALC-MCNC: 3.8 G/DL (ref 2–4)
GLUCOSE SERPL-MCNC: 98 MG/DL (ref 65–100)
GLUCOSE UR STRIP.AUTO-MCNC: NEGATIVE MG/DL
HCT VFR BLD AUTO: 36.3 % (ref 35–47)
HGB BLD-MCNC: 12.8 G/DL (ref 11.5–16)
HGB UR QL STRIP: NEGATIVE
HYALINE CASTS URNS QL MICRO: ABNORMAL /LPF (ref 0–5)
KETONES UR QL STRIP.AUTO: 15 MG/DL
LACTATE SERPL-SCNC: 1.6 MMOL/L (ref 0.4–2)
LEUKOCYTE ESTERASE UR QL STRIP.AUTO: ABNORMAL
LIPASE SERPL-CCNC: 231 U/L (ref 73–393)
LYMPHOCYTES # BLD: 2.5 K/UL (ref 0.8–3.5)
LYMPHOCYTES NFR BLD: 31 % (ref 12–49)
MCH RBC QN AUTO: 32.1 PG (ref 26–34)
MCHC RBC AUTO-ENTMCNC: 35.3 G/DL (ref 30–36.5)
MCV RBC AUTO: 91 FL (ref 80–99)
MONOCYTES # BLD: 0.7 K/UL (ref 0–1)
MONOCYTES NFR BLD: 9 % (ref 5–13)
NEUTS SEG # BLD: 4.5 K/UL (ref 1.8–8)
NEUTS SEG NFR BLD: 55 % (ref 32–75)
NITRITE UR QL STRIP.AUTO: NEGATIVE
PH UR STRIP: 6 [PH] (ref 5–8)
PLATELET # BLD AUTO: 354 K/UL (ref 150–400)
POTASSIUM SERPL-SCNC: 3.1 MMOL/L (ref 3.5–5.1)
PROT SERPL-MCNC: 7.4 G/DL (ref 6.4–8.2)
PROT UR STRIP-MCNC: NEGATIVE MG/DL
RBC # BLD AUTO: 3.99 M/UL (ref 3.8–5.2)
RBC #/AREA URNS HPF: ABNORMAL /HPF (ref 0–5)
SODIUM SERPL-SCNC: 134 MMOL/L (ref 136–145)
SP GR UR REFRACTOMETRY: 1.02 (ref 1–1.03)
UA: UC IF INDICATED,UAUC: ABNORMAL
UROBILINOGEN UR QL STRIP.AUTO: 0.2 EU/DL (ref 0.2–1)
WBC # BLD AUTO: 8 K/UL (ref 3.6–11)
WBC URNS QL MICRO: ABNORMAL /HPF (ref 0–4)

## 2017-10-24 PROCEDURE — 87077 CULTURE AEROBIC IDENTIFY: CPT | Performed by: EMERGENCY MEDICINE

## 2017-10-24 PROCEDURE — 96375 TX/PRO/DX INJ NEW DRUG ADDON: CPT

## 2017-10-24 PROCEDURE — 74176 CT ABD & PELVIS W/O CONTRAST: CPT

## 2017-10-24 PROCEDURE — 74011250636 HC RX REV CODE- 250/636: Performed by: EMERGENCY MEDICINE

## 2017-10-24 PROCEDURE — 83605 ASSAY OF LACTIC ACID: CPT | Performed by: EMERGENCY MEDICINE

## 2017-10-24 PROCEDURE — 36415 COLL VENOUS BLD VENIPUNCTURE: CPT | Performed by: EMERGENCY MEDICINE

## 2017-10-24 PROCEDURE — 83690 ASSAY OF LIPASE: CPT | Performed by: EMERGENCY MEDICINE

## 2017-10-24 PROCEDURE — 80053 COMPREHEN METABOLIC PANEL: CPT | Performed by: EMERGENCY MEDICINE

## 2017-10-24 PROCEDURE — 74011250637 HC RX REV CODE- 250/637: Performed by: EMERGENCY MEDICINE

## 2017-10-24 PROCEDURE — 96365 THER/PROPH/DIAG IV INF INIT: CPT

## 2017-10-24 PROCEDURE — 81001 URINALYSIS AUTO W/SCOPE: CPT | Performed by: EMERGENCY MEDICINE

## 2017-10-24 PROCEDURE — 99284 EMERGENCY DEPT VISIT MOD MDM: CPT

## 2017-10-24 PROCEDURE — 74011000258 HC RX REV CODE- 258: Performed by: EMERGENCY MEDICINE

## 2017-10-24 PROCEDURE — 87086 URINE CULTURE/COLONY COUNT: CPT | Performed by: EMERGENCY MEDICINE

## 2017-10-24 PROCEDURE — 87186 SC STD MICRODIL/AGAR DIL: CPT | Performed by: EMERGENCY MEDICINE

## 2017-10-24 PROCEDURE — 85025 COMPLETE CBC W/AUTO DIFF WBC: CPT | Performed by: EMERGENCY MEDICINE

## 2017-10-24 RX ORDER — SODIUM CHLORIDE 0.9 % (FLUSH) 0.9 %
5-10 SYRINGE (ML) INJECTION AS NEEDED
Status: DISCONTINUED | OUTPATIENT
Start: 2017-10-24 | End: 2017-10-24 | Stop reason: HOSPADM

## 2017-10-24 RX ORDER — POTASSIUM CHLORIDE 750 MG/1
40 TABLET, FILM COATED, EXTENDED RELEASE ORAL
Status: COMPLETED | OUTPATIENT
Start: 2017-10-24 | End: 2017-10-24

## 2017-10-24 RX ORDER — CEFUROXIME AXETIL 500 MG/1
500 TABLET ORAL 2 TIMES DAILY
Qty: 14 TAB | Refills: 0 | Status: SHIPPED | OUTPATIENT
Start: 2017-10-24 | End: 2017-10-31

## 2017-10-24 RX ORDER — SODIUM CHLORIDE 0.9 % (FLUSH) 0.9 %
5-10 SYRINGE (ML) INJECTION EVERY 8 HOURS
Status: DISCONTINUED | OUTPATIENT
Start: 2017-10-24 | End: 2017-10-24 | Stop reason: HOSPADM

## 2017-10-24 RX ORDER — FENTANYL CITRATE 50 UG/ML
25 INJECTION, SOLUTION INTRAMUSCULAR; INTRAVENOUS
Status: COMPLETED | OUTPATIENT
Start: 2017-10-24 | End: 2017-10-24

## 2017-10-24 RX ORDER — POTASSIUM CHLORIDE 7.45 MG/ML
10 INJECTION INTRAVENOUS
Status: DISCONTINUED | OUTPATIENT
Start: 2017-10-24 | End: 2017-10-24

## 2017-10-24 RX ORDER — POLYETHYLENE GLYCOL 3350 17 G/17G
17 POWDER, FOR SOLUTION ORAL DAILY
Qty: 289 G | Refills: 0 | Status: SHIPPED | OUTPATIENT
Start: 2017-10-24

## 2017-10-24 RX ADMIN — FENTANYL CITRATE 25 MCG: 50 INJECTION, SOLUTION INTRAMUSCULAR; INTRAVENOUS at 03:36

## 2017-10-24 RX ADMIN — CEFTRIAXONE 1 G: 1 INJECTION, POWDER, FOR SOLUTION INTRAMUSCULAR; INTRAVENOUS at 04:58

## 2017-10-24 RX ADMIN — POTASSIUM CHLORIDE 40 MEQ: 750 TABLET, FILM COATED, EXTENDED RELEASE ORAL at 05:55

## 2017-10-24 NOTE — ED NOTES
Assumed care of patient from MARIBEL Ramos., RN at bedside with verbal report consisting of situation, background, assessment and recommendations (SBAR). Pt. Is A/O X 2. Pt. Has history of dementia and this is normal per her. Pt. Was A/O x 2 for previous RN as well. Pt. Resting comfortably on stretcher in a position of comfort. Call bell is within reach. Side rails x 2. Cardiac monitor x 2. Stretcher locked and in lowest position. Concerns and questions addressed at this time. Pt. Is in no acute distress at this time. Will continue to monitor.

## 2017-10-24 NOTE — ED PROVIDER NOTES
[facility] 78.  EMERGENCY DEPARTMENT HISTORY AND PHYSICAL EXAM       Date of Service: 10/24/2017   Patient Name: Yogi Saravia   YOB: 1940  Medical Record Number: [id number]    History of Presenting Illness     Chief Complaint   Patient presents with    Abdominal Pain     Pt ambulatory to triage, states abd pain x Friday worsening around 1900 this evevning; pt denies N/V/D;    Urinary Frequency     Per daughter, pt has been urinating \" a lot, but shes also been drinking a lot of water\"        History Provided By:  patient and Daughter     Additional History:     Yogi Saravia is a 78 y.o. female, who presents ambulatory to the ED c/o worsened, diffuse abdominal pain x 4 days. Pt notes the left side is worse than the right. The pt's daughter notes the pt has had increased urinary frequency, but she also has been taking in more fluids. She reports normal bowel movements and a normal appetite. Pt denies having a PCP. Of note, the pt has a PMHx of Dementia and lives with her daughter. She specifically denies any recent fever, chills, nausea, vomiting, diarrhea, CP, SOB, lightheadedness, dizziness, numbness, weakness, tingling, BLE swelling, HA, heart palpitations, changes in BM, changes in PO intake, melena, hematochezia, cough, or congestion. PMHx: Significant for anemia, Transient global amnesia- Dementia   PSHx: Significant for hysterectomy, cholecystectomy  Social Hx: -tobacco, -EtOH, -Illicit Drugs     There are no other complaints, changes, or physical findings at this time. Primary Care Provider: None     Past History     Past Medical History:   Past Medical History:   Diagnosis Date    Anemia     Transient global amnesia     dx 4 years ago.         Past Surgical History:   Past Surgical History:   Procedure Laterality Date    HX CATARACT REMOVAL      Bilateral    HX CHOLECYSTECTOMY      HX HYSTERECTOMY      HX KNEE ARTHROSCOPY      Right Family History:   History reviewed. No pertinent family history. Social History:   Social History   Substance Use Topics    Smoking status: Never Smoker    Smokeless tobacco: Never Used    Alcohol use No        Allergies:   No Known Allergies      Review of Systems   Review of Systems   Constitutional: Negative for chills and fever. HENT: Negative for congestion, ear pain, rhinorrhea and sore throat. Respiratory: Negative for cough and shortness of breath. Cardiovascular: Negative for chest pain, palpitations and leg swelling. Gastrointestinal: Positive for abdominal pain. Negative for constipation, diarrhea, nausea and vomiting. No melena  No hematochezia   Endocrine: Negative for polyuria. Genitourinary: Positive for frequency. Negative for dysuria and hematuria. Neurological: Negative for dizziness, weakness, light-headedness, numbness and headaches. No tingling   All other systems reviewed and are negative.         Physical Exam    General appearance - well nourished, well appearing, and in no distress; yells out and grimaces upon palpitations, worse in the L abdomen; pt not corporative w/ interview    Eyes - pupils equal and reactive, extraocular eye movements intact  ENT - mucous membranes moist, pharynx normal without lesions  Neck - supple, no significant adenopathy; non-tender to palpation  Chest - clear to auscultation, no wheezes, rales or rhonchi; non-tender to palpation  Heart - normal rate and regular rhythm, S1 and S2 normal, no murmurs noted  Abdomen - soft, generalized tenderness, nondistended, no masses or organomegaly  Musculoskeletal - no joint tenderness, deformity or swelling; normal ROM  Extremities - peripheral pulses normal, no pedal edema  Skin - normal coloration and turgor, no rashes  Neurological - alert, oriented to person and place, but confused as to reason for visit, normal speech, no focal findings or movement disorder noted  Written by Tyrell Cockayne Jalil Marquez, ED Scribe, as dictated by Chacorta Rivas MD      Provider Notes / MDM:     DDx: Bowel obstruction, Diverticulitis, appendicitis, UTI, constipation      Medical Decision Making   I am the first provider for this patient. I reviewed the vital signs, available nursing notes, past medical history, past surgical history, family history and social history. Old Medical Records: Old medical records. Nursing notes. ED Course:   3:15 AM   Initial assessment performed. The patients presenting problems have been discussed, and they are in agreement with the care plan formulated and outlined with them. I have encouraged them to ask questions as they arise throughout their visit. Progress Notes:     3:15 AM  Pulse Oximetry Analysis - Normal 99% on RA    Cardiac Monitor:   Rate: 77   Rhythm: Normal Sinus Rhythm        Progress note:  6:00 AM  Pt noted to be feeling better , ready for discharge. Updated pt and/or family on all final lab and imaging findings. Will follow up as instructed. All questions have been answered, pt voiced understanding and agreement with plan. Specific return precautions provided as well as instructions to return to the ED should sx worsen at any time. Vital signs stable for discharge. Written by Joe Renee ED Scribe, as dictated by Sanjana Lopez MD    Diagnostic Study Results:       Labs       Recent Results (from the past 12 hour(s))   CBC WITH AUTOMATED DIFF    Collection Time: 10/24/17  2:40 AM   Result Value Ref Range    WBC 8.0 3.6 - 11.0 K/uL    RBC 3.99 3.80 - 5.20 M/uL    HGB 12.8 11.5 - 16.0 g/dL    HCT 36.3 35.0 - 47.0 %    MCV 91.0 80.0 - 99.0 FL    MCH 32.1 26.0 - 34.0 PG    MCHC 35.3 30.0 - 36.5 g/dL    RDW 12.8 11.5 - 14.5 %    PLATELET 663 303 - 884 K/uL    NEUTROPHILS 55 32 - 75 %    LYMPHOCYTES 31 12 - 49 %    MONOCYTES 9 5 - 13 %    EOSINOPHILS 5 0 - 7 %    BASOPHILS 0 0 - 1 %    ABS. NEUTROPHILS 4.5 1.8 - 8.0 K/UL    ABS.  LYMPHOCYTES 2.5 0.8 - 3.5 K/UL    ABS. MONOCYTES 0.7 0.0 - 1.0 K/UL    ABS. EOSINOPHILS 0.4 0.0 - 0.4 K/UL    ABS. BASOPHILS 0.0 0.0 - 0.1 K/UL   METABOLIC PANEL, COMPREHENSIVE    Collection Time: 10/24/17  2:40 AM   Result Value Ref Range    Sodium 134 (L) 136 - 145 mmol/L    Potassium 3.1 (L) 3.5 - 5.1 mmol/L    Chloride 102 97 - 108 mmol/L    CO2 22 21 - 32 mmol/L    Anion gap 10 5 - 15 mmol/L    Glucose 98 65 - 100 mg/dL    BUN 12 6 - 20 MG/DL    Creatinine 1.20 (H) 0.55 - 1.02 MG/DL    BUN/Creatinine ratio 10 (L) 12 - 20      GFR est AA 53 (L) >60 ml/min/1.73m2    GFR est non-AA 44 (L) >60 ml/min/1.73m2    Calcium 10.0 8.5 - 10.1 MG/DL    Bilirubin, total 0.3 0.2 - 1.0 MG/DL    ALT (SGPT) 20 12 - 78 U/L    AST (SGOT) 25 15 - 37 U/L    Alk.  phosphatase 79 45 - 117 U/L    Protein, total 7.4 6.4 - 8.2 g/dL    Albumin 3.6 3.5 - 5.0 g/dL    Globulin 3.8 2.0 - 4.0 g/dL    A-G Ratio 0.9 (L) 1.1 - 2.2     LIPASE    Collection Time: 10/24/17  2:40 AM   Result Value Ref Range    Lipase 231 73 - 393 U/L   URINALYSIS W/ REFLEX CULTURE    Collection Time: 10/24/17  2:40 AM   Result Value Ref Range    Color YELLOW/STRAW      Appearance CLOUDY (A) CLEAR      Specific gravity 1.017 1.003 - 1.030      pH (UA) 6.0 5.0 - 8.0      Protein NEGATIVE  NEG mg/dL    Glucose NEGATIVE  NEG mg/dL    Ketone 15 (A) NEG mg/dL    Bilirubin NEGATIVE  NEG      Blood NEGATIVE  NEG      Urobilinogen 0.2 0.2 - 1.0 EU/dL    Nitrites NEGATIVE  NEG      Leukocyte Esterase LARGE (A) NEG      WBC  0 - 4 /hpf    RBC 0-5 0 - 5 /hpf    Epithelial cells FEW FEW /lpf    Bacteria 1+ (A) NEG /hpf    UA:UC IF INDICATED URINE CULTURE ORDERED (A) CNI      Hyaline cast 2-5 0 - 5 /lpf   LACTIC ACID    Collection Time: 10/24/17  3:40 AM   Result Value Ref Range    Lactic acid 1.6 0.4 - 2.0 MMOL/L         Radiology - The following have been ordered and reviewed:    CT Results  (Last 48 hours)               10/24/17 0423  CT ABD PELV WO CONT Final result    Impression:  IMPRESSION:   No acute abnormality. Narrative:  EXAM:  CT ABD PELV WO CONT       INDICATION: Abdominal pain for 4 days       COMPARISON: 9/22/2017       CONTRAST:  None. TECHNIQUE:    Thin axial images were obtained through the abdomen and pelvis. Coronal and   sagittal reconstructions were generated. Oral contrast was not administered. CT   dose reduction was achieved through use of a standardized protocol tailored for   this examination and automatic exposure control for dose modulation. The absence of intravenous contrast material reduces the sensitivity for   evaluation of the solid parenchymal organs of the abdomen. FINDINGS:    LUNG BASES: Clear. INCIDENTALLY IMAGED HEART AND MEDIASTINUM: Unremarkable. LIVER: Hepatic cysts are stable. GALLBLADDER: Surgically absent. SPLEEN: No mass. PANCREAS: No mass or ductal dilatation. ADRENALS: Unremarkable. KIDNEYS/URETERS: No mass, calculus, or hydronephrosis. STOMACH: Unremarkable. SMALL BOWEL: No dilatation or wall thickening. COLON: No dilatation or wall thickening. APPENDIX: Unremarkable. PERITONEUM: No ascites or pneumoperitoneum. RETROPERITONEUM: No lymphadenopathy or aortic aneurysm. REPRODUCTIVE ORGANS: The uterus is surgically absent. URINARY BLADDER: No mass or calculus. BONES: No destructive bone lesion. ADDITIONAL COMMENTS: N/A                 Vital Signs - Reviewed the patient's vital signs.      Patient Vitals for the past 12 hrs:   Temp Pulse Resp BP SpO2   10/24/17 0600 - - - (!) 180/99 99 %   10/24/17 0533 - - - - 100 %   10/24/17 0530 - - - - 100 %   10/24/17 0500 - - - - 99 %   10/24/17 0430 - - - 151/86 99 %   10/24/17 0400 - - - 154/80 99 %   10/24/17 0345 - - - 150/75 99 %   10/24/17 0341 - - - - 99 %   10/24/17 0339 - - - 163/83 -   10/24/17 0212 97.5 °F (36.4 °C) 77 18 (!) 159/96 99 %       Medications Given in the ED:    Medications   sodium chloride (NS) flush 5-10 mL (not administered)   sodium chloride (NS) flush 5-10 mL (not administered)   fentaNYL citrate (PF) injection 25 mcg (25 mcg IntraVENous Given 10/24/17 0336)   cefTRIAXone (ROCEPHIN) 1 g in 0.9% sodium chloride (MBP/ADV) 50 mL (0 g IntraVENous IV Completed 10/24/17 0605)   potassium chloride SR (KLOR-CON 10) tablet 40 mEq (40 mEq Oral Given 10/24/17 0555)         Diagnosis   Clinical Impression:   1. Abdominal pain, generalized    2. Urinary tract infection without hematuria, site unspecified    3. Hypokalemia         Plan:  1:   Follow-up Information     Follow up With Details Comments Contact Info    [facility] EMERGENCY DEPT  If symptoms worsen [address] [place] [address]  [phone]        2:   Discharge Medication List as of 10/24/2017  5:44 AM      START taking these medications    Details   cefUROXime (CEFTIN) 500 mg tablet Take 1 Tab by mouth two (2) times a day for 7 days. , Print, Disp-14 Tab, R-0      polyethylene glycol (MIRALAX) 17 gram/dose powder Take 17 g by mouth daily. , Print, Disp-289 g, R-0         CONTINUE these medications which have NOT CHANGED    Details   ASPIRIN/ACETAMINOPHEN/CAFFEINE (EXCEDRIN MIGRAINE PO) Take 1 Tab by mouth as needed., Historical Med         STOP taking these medications       polyethylene glycol (MIRALAX) 17 gram packet Comments:   Reason for Stopping:             Return to ED if Worse    Disposition Note:    Discharge Note:  6:06 AM  The pt is ready for discharge. The pt's signs, symptoms, diagnosis, and discharge instructions have been discussed and pt has conveyed their understanding. The pt is to follow up as recommended or return to ER should their symptoms worsen. Plan has been discussed and pt is in agreement. This note is prepared by Freda Hamilton, acting as Scribe for MD Sanjana Wilson MD : The scribe's documentation has been prepared under my direction and personally reviewed by me in its entirety.  I confirm that the note above accurately reflects all work, treatment, procedures, and medical decision making performed by me. This note will not be viewable in [address]

## 2017-10-24 NOTE — ED NOTES
Dr. Bozena Bethea gave and reviewed discharge instructions with the patient. The patient verbalized understanding. The patient was given opportunity for questions. Patient discharged in stable condition to the waiting room with 2 female visitor.

## 2017-10-24 NOTE — ED NOTES
RN offered to place another IV to run the potassium in one iv and rocephin in another, and pt stated she wanted \"as little as possible\" and only one IV.

## 2017-10-24 NOTE — DISCHARGE INSTRUCTIONS
Hypokalemia: Care Instructions  Your Care Instructions  Hypokalemia (say \"we-of-pov-KEENAN-finn-uh\") is a low level of potassium. The heart, muscles, kidneys, and nervous system all need potassium to work well. This problem has many different causes. Kidney problems, diet, and medicines like diuretics and laxatives can cause it. So can vomiting or diarrhea. In some cases, cancer is the cause. Your doctor may do tests to find the cause of your low potassium levels. You may need medicines to bring your potassium levels back to normal. You may also need regular blood tests to check your potassium. If you have very low potassium, you may need intravenous (IV) medicines. You also may need tests to check the electrical activity of your heart. Heart problems caused by low potassium levels can be very serious. Follow-up care is a key part of your treatment and safety. Be sure to make and go to all appointments, and call your doctor if you are having problems. It's also a good idea to know your test results and keep a list of the medicines you take. How can you care for yourself at home? · If your doctor recommends it, eat foods that have a lot of potassium. These include fresh fruits, juices, and vegetables. They also include nuts, beans, and milk. · Be safe with medicines. If your doctor prescribes medicines or potassium supplements, take them exactly as directed. Call your doctor if you have any problems with your medicines. · Get your potassium levels tested as often as your doctor tells you. When should you call for help? Call 911 anytime you think you may need emergency care. For example, call if:  · You feel like your heart is missing beats. Heart problems caused by low potassium can cause death. · You passed out (lost consciousness). · You have a seizure. Call your doctor now or seek immediate medical care if:  · You feel weak or unusually tired. · You have severe arm or leg cramps.   · You have tingling or numbness. · You feel sick to your stomach, or you vomit. · You have belly cramps. · You feel bloated or constipated. · You have to urinate a lot. · You feel very thirsty most of the time. · You are dizzy or lightheaded, or you feel like you may faint. · You feel depressed, or you lose touch with reality. Watch closely for changes in your health, and be sure to contact your doctor if:  · You do not get better as expected. Where can you learn more? Go to http://quinton-armen.info/. Enter G358 in the search box to learn more about \"Hypokalemia: Care Instructions. \"  Current as of: July 28, 2016  Content Version: 11.3  © 3499-1261 [employer]. Care instructions adapted under license by [employer] (which disclaims liability or warranty for this information). If you have questions about a medical condition or this instruction, always ask your healthcare professional. [place] [address] any warranty or liability for your use of this information. Abdominal Pain: Care Instructions  Your Care Instructions    Abdominal pain has many possible causes. Some aren't serious and get better on their own in a few days. Others need more testing and treatment. If your pain continues or gets worse, you need to be rechecked and may need more tests to find out what is wrong. You may need surgery to correct the problem. Don't ignore new symptoms, such as fever, nausea and vomiting, urination problems, pain that gets worse, and dizziness. These may be signs of a more serious problem. Your doctor may have recommended a follow-up visit in the next 8 to 12 hours. If you are not getting better, you may need more tests or treatment. The doctor has checked you carefully, but problems can develop later. If you notice any problems or new symptoms, get medical treatment right away. Follow-up care is a key part of your treatment and safety.  Be sure to make and go to all appointments, and call your doctor if you are having problems. It's also a good idea to know your test results and keep a list of the medicines you take. How can you care for yourself at home? · Rest until you feel better. · To prevent dehydration, drink plenty of fluids, enough so that your urine is light yellow or clear like water. Choose water and other caffeine-free clear liquids until you feel better. If you have kidney, heart, or liver disease and have to limit fluids, talk with your doctor before you increase the amount of fluids you drink. · If your stomach is upset, eat mild foods, such as rice, dry toast or crackers, bananas, and applesauce. Try eating several small meals instead of two or three large ones. · Wait until 48 hours after all symptoms have gone away before you have spicy foods, alcohol, and drinks that contain caffeine. · Do not eat foods that are high in fat. · Avoid anti-inflammatory medicines such as aspirin, ibuprofen (Advil, Motrin), and naproxen (Aleve). These can cause stomach upset. Talk to your doctor if you take daily aspirin for another health problem. When should you call for help? Call 911 anytime you think you may need emergency care. For example, call if:  · You passed out (lost consciousness). · You pass maroon or very bloody stools. · You vomit blood or what looks like coffee grounds. · You have new, severe belly pain. Call your doctor now or seek immediate medical care if:  · Your pain gets worse, especially if it becomes focused in one area of your belly. · You have a new or higher fever. · Your stools are black and look like tar, or they have streaks of blood. · You have unexpected vaginal bleeding. · You have symptoms of a urinary tract infection. These may include:  ¨ Pain when you urinate. ¨ Urinating more often than usual.  ¨ Blood in your urine. · You are dizzy or lightheaded, or you feel like you may faint.   Watch closely for changes in your health, and be sure to contact your doctor if:  · You are not getting better after 1 day (24 hours). Where can you learn more? Go to http://quinton-armen.info/. Enter Q587 in the search box to learn more about \"Abdominal Pain: Care Instructions. \"  Current as of: March 20, 2017  Content Version: 11.3  © 5346-6589 [employer]. Care instructions adapted under license by [employer] (which disclaims liability or warranty for this information). If you have questions about a medical condition or this instruction, always ask your healthcare professional. [place] [address] any warranty or liability for your use of this information. Urinary Tract Infection in Women: Care Instructions  Your Care Instructions    A urinary tract infection, or UTI, is a general term for an infection anywhere between the kidneys and the urethra (where urine comes out). Most UTIs are bladder infections. They often cause pain or burning when you urinate. UTIs are caused by bacteria and can be cured with antibiotics. Be sure to complete your treatment so that the infection goes away. Follow-up care is a key part of your treatment and safety. Be sure to make and go to all appointments, and call your doctor if you are having problems. It's also a good idea to know your test results and keep a list of the medicines you take. How can you care for yourself at home? · Take your antibiotics as directed. Do not stop taking them just because you feel better. You need to take the full course of antibiotics. · Drink extra water and other fluids for the next day or two. This may help wash out the bacteria that are causing the infection. (If you have kidney, heart, or liver disease and have to limit fluids, talk with your doctor before you increase your fluid intake.)  · Avoid drinks that are carbonated or have caffeine. They can irritate the bladder. · Urinate often.  Try to empty your bladder each time. · To relieve pain, take a hot bath or lay a heating pad set on low over your lower belly or genital area. Never go to sleep with a heating pad in place. To prevent UTIs  · Drink plenty of water each day. This helps you urinate often, which clears bacteria from your system. (If you have kidney, heart, or liver disease and have to limit fluids, talk with your doctor before you increase your fluid intake.)  · Urinate when you need to. · Urinate right after you have sex. · Change sanitary pads often. · Avoid douches, bubble baths, feminine hygiene sprays, and other feminine hygiene products that have deodorants. · After going to the bathroom, wipe from front to back. When should you call for help? Call your doctor now or seek immediate medical care if:  · Symptoms such as fever, chills, nausea, or vomiting get worse or appear for the first time. · You have new pain in your back just below your rib cage. This is called flank pain. · There is new blood or pus in your urine. · You have any problems with your antibiotic medicine. Watch closely for changes in your health, and be sure to contact your doctor if:  · You are not getting better after taking an antibiotic for 2 days. · Your symptoms go away but then come back. Where can you learn more? Go to http://quinton-armen.info/. Enter R148 in the search box to learn more about \"Urinary Tract Infection in Women: Care Instructions. \"  Current as of: November 28, 2016  Content Version: 11.3  © 7362-8921 [employer]. Care instructions adapted under license by [employer] (which disclaims liability or warranty for this information). If you have questions about a medical condition or this instruction, always ask your healthcare professional. [place] [address] any warranty or liability for your use of this information.

## 2017-10-26 LAB
BACTERIA SPEC CULT: ABNORMAL
CC UR VC: ABNORMAL
SERVICE CMNT-IMP: ABNORMAL

## 2019-03-11 ENCOUNTER — HOSPITAL ENCOUNTER (EMERGENCY)
Age: 79
Discharge: HOME | End: 2019-03-11
Attending: EMERGENCY MEDICINE
Payer: MEDICARE

## 2019-03-11 VITALS
RESPIRATION RATE: 18 BRPM | TEMPERATURE: 98 F | DIASTOLIC BLOOD PRESSURE: 77 MMHG | WEIGHT: 210.19 LBS | OXYGEN SATURATION: 99 % | BODY MASS INDEX: 35.02 KG/M2 | SYSTOLIC BLOOD PRESSURE: 133 MMHG | HEART RATE: 98 BPM | HEIGHT: 65 IN

## 2019-03-11 DIAGNOSIS — N39.0 URINARY TRACT INFECTION WITHOUT HEMATURIA, SITE UNSPECIFIED: ICD-10-CM

## 2019-03-11 DIAGNOSIS — F03.90 DEMENTIA WITHOUT BEHAVIORAL DISTURBANCE, UNSPECIFIED DEMENTIA TYPE: ICD-10-CM

## 2019-03-11 DIAGNOSIS — R55 SYNCOPE AND COLLAPSE: ICD-10-CM

## 2019-03-11 DIAGNOSIS — K52.9 GASTROENTERITIS, ACUTE: Primary | ICD-10-CM

## 2019-03-11 LAB
ALBUMIN SERPL-MCNC: 3.4 G/DL (ref 3.5–5)
ALBUMIN/GLOB SERPL: 0.9 {RATIO} (ref 1.1–2.2)
ALP SERPL-CCNC: 132 U/L (ref 45–117)
ALT SERPL-CCNC: 30 U/L (ref 12–78)
ANION GAP SERPL CALC-SCNC: 7 MMOL/L (ref 5–15)
APPEARANCE UR: ABNORMAL
AST SERPL-CCNC: 31 U/L (ref 15–37)
BACTERIA URNS QL MICRO: ABNORMAL /HPF
BASOPHILS # BLD: 0 K/UL (ref 0–0.1)
BASOPHILS NFR BLD: 0 % (ref 0–1)
BILIRUB SERPL-MCNC: 0.5 MG/DL (ref 0.2–1)
BILIRUB UR QL: NEGATIVE
BUN SERPL-MCNC: 25 MG/DL (ref 6–20)
BUN/CREAT SERPL: 22 (ref 12–20)
CALCIUM SERPL-MCNC: 9.7 MG/DL (ref 8.5–10.1)
CHLORIDE SERPL-SCNC: 105 MMOL/L (ref 97–108)
CO2 SERPL-SCNC: 24 MMOL/L (ref 21–32)
COLOR UR: ABNORMAL
CREAT SERPL-MCNC: 1.15 MG/DL (ref 0.55–1.02)
DIFFERENTIAL METHOD BLD: ABNORMAL
EOSINOPHIL # BLD: 0.2 K/UL (ref 0–0.4)
EOSINOPHIL NFR BLD: 1 % (ref 0–7)
EPITH CASTS URNS QL MICRO: ABNORMAL /LPF
ERYTHROCYTE [DISTWIDTH] IN BLOOD BY AUTOMATED COUNT: 12.2 % (ref 11.5–14.5)
GLOBULIN SER CALC-MCNC: 4 G/DL (ref 2–4)
GLUCOSE SERPL-MCNC: 96 MG/DL (ref 65–100)
GLUCOSE UR STRIP.AUTO-MCNC: NEGATIVE MG/DL
HCT VFR BLD AUTO: 38.3 % (ref 35–47)
HGB BLD-MCNC: 13.4 G/DL (ref 11.5–16)
HGB UR QL STRIP: NEGATIVE
HYALINE CASTS URNS QL MICRO: ABNORMAL /LPF (ref 0–5)
IMM GRANULOCYTES # BLD AUTO: 0 K/UL (ref 0–0.04)
IMM GRANULOCYTES NFR BLD AUTO: 0 % (ref 0–0.5)
KETONES UR QL STRIP.AUTO: NEGATIVE MG/DL
LACTATE SERPL-SCNC: 1.2 MMOL/L (ref 0.4–2)
LEUKOCYTE ESTERASE UR QL STRIP.AUTO: ABNORMAL
LYMPHOCYTES # BLD: 2 K/UL (ref 0.8–3.5)
LYMPHOCYTES NFR BLD: 17 % (ref 12–49)
MCH RBC QN AUTO: 32.4 PG (ref 26–34)
MCHC RBC AUTO-ENTMCNC: 35 G/DL (ref 30–36.5)
MCV RBC AUTO: 92.7 FL (ref 80–99)
MONOCYTES # BLD: 0.7 K/UL (ref 0–1)
MONOCYTES NFR BLD: 6 % (ref 5–13)
NEUTS SEG # BLD: 8.7 K/UL (ref 1.8–8)
NEUTS SEG NFR BLD: 76 % (ref 32–75)
NITRITE UR QL STRIP.AUTO: NEGATIVE
NRBC # BLD: 0 K/UL (ref 0–0.01)
NRBC BLD-RTO: 0 PER 100 WBC
PH UR STRIP: 7 [PH] (ref 5–8)
PLATELET # BLD AUTO: 280 K/UL (ref 150–400)
PMV BLD AUTO: 10.3 FL (ref 8.9–12.9)
POTASSIUM SERPL-SCNC: 4.6 MMOL/L (ref 3.5–5.1)
PROT SERPL-MCNC: 7.4 G/DL (ref 6.4–8.2)
PROT UR STRIP-MCNC: NEGATIVE MG/DL
RBC # BLD AUTO: 4.13 M/UL (ref 3.8–5.2)
RBC #/AREA URNS HPF: ABNORMAL /HPF (ref 0–5)
SODIUM SERPL-SCNC: 136 MMOL/L (ref 136–145)
SP GR UR REFRACTOMETRY: 1.01 (ref 1–1.03)
UA: UC IF INDICATED,UAUC: ABNORMAL
UROBILINOGEN UR QL STRIP.AUTO: 1 EU/DL (ref 0.2–1)
WBC # BLD AUTO: 11.6 K/UL (ref 3.6–11)
WBC URNS QL MICRO: ABNORMAL /HPF (ref 0–4)

## 2019-03-11 PROCEDURE — 74011250636 HC RX REV CODE- 250/636: Performed by: EMERGENCY MEDICINE

## 2019-03-11 PROCEDURE — 80053 COMPREHEN METABOLIC PANEL: CPT

## 2019-03-11 PROCEDURE — 87186 SC STD MICRODIL/AGAR DIL: CPT

## 2019-03-11 PROCEDURE — 87077 CULTURE AEROBIC IDENTIFY: CPT

## 2019-03-11 PROCEDURE — 81001 URINALYSIS AUTO W/SCOPE: CPT

## 2019-03-11 PROCEDURE — 36415 COLL VENOUS BLD VENIPUNCTURE: CPT

## 2019-03-11 PROCEDURE — 85025 COMPLETE CBC W/AUTO DIFF WBC: CPT

## 2019-03-11 PROCEDURE — 99284 EMERGENCY DEPT VISIT MOD MDM: CPT

## 2019-03-11 PROCEDURE — 93005 ELECTROCARDIOGRAM TRACING: CPT

## 2019-03-11 PROCEDURE — 96366 THER/PROPH/DIAG IV INF ADDON: CPT

## 2019-03-11 PROCEDURE — 96365 THER/PROPH/DIAG IV INF INIT: CPT

## 2019-03-11 PROCEDURE — 83605 ASSAY OF LACTIC ACID: CPT

## 2019-03-11 PROCEDURE — 87086 URINE CULTURE/COLONY COUNT: CPT

## 2019-03-11 PROCEDURE — 74011000258 HC RX REV CODE- 258: Performed by: EMERGENCY MEDICINE

## 2019-03-11 RX ORDER — CEPHALEXIN 500 MG/1
500 CAPSULE ORAL 3 TIMES DAILY
Qty: 30 CAP | Refills: 0 | Status: SHIPPED | OUTPATIENT
Start: 2019-03-11 | End: 2019-03-21

## 2019-03-11 RX ADMIN — CEFTRIAXONE SODIUM 1 G: 1 INJECTION, POWDER, FOR SOLUTION INTRAMUSCULAR; INTRAVENOUS at 17:12

## 2019-03-11 RX ADMIN — SODIUM CHLORIDE 1000 ML: 900 INJECTION, SOLUTION INTRAVENOUS at 17:55

## 2019-03-11 NOTE — PROGRESS NOTES
CM reviewed chart. Pt asked CM to step out the room. Per daughter, at bedside, pt does not work well with woman. Daughter, Marco Antonio Saldana confirmed all pt demographics. Julien Bell explained pt lives with her, her , and her two adult children. Per daughter, pt does not use a cane or walker. Per daughter, pt has not officially been diagnosed with dementia but is currently looking for someone to follow-up with. Daughter agreed to have pt set up with PCP. CM scheduled PCP with  Rick John at [place] [address], [place] [address]  [phone] at 10 am pt asked to arrived 30 mins early with photo ID, insurance card, and list of any medications. CM attempted to schedule psychiatry follow up at [facility] with [address]. Karla Smith MD, but was not able to get connected to the office. Daughter requesting pt only see male providers for her follow-up. CM will add information for psych follow-up so daughter can follow-up during business hours.        [email or web address] MSW, QMHP

## 2019-03-11 NOTE — DISCHARGE INSTRUCTIONS
Patient Education        Fainting: Care Instructions  Your Care Instructions    When you faint, or pass out, you lose consciousness for a short time. A brief drop in blood flow to the brain often causes it. When you fall or lie down, more blood flows to your brain and you regain consciousness. Emotional stress, pain, or overheating--especially if you have been standing--can make you faint. In these cases, fainting is usually not serious. But fainting can be a sign of a more serious problem. Your doctor may want you to have more tests to rule out other causes. The treatment you need depends on the reason why you fainted. The doctor has checked you carefully, but problems can develop later. If you notice any problems or new symptoms, get medical treatment right away. Follow-up care is a key part of your treatment and safety. Be sure to make and go to all appointments, and call your doctor if you are having problems. It's also a good idea to know your test results and keep a list of the medicines you take. How can you care for yourself at home? · Drink plenty of fluids to prevent dehydration. If you have kidney, heart, or liver disease and have to limit fluids, talk with your doctor before you increase your fluid intake. When should you call for help? Call 911 anytime you think you may need emergency care. For example, call if:    · You have symptoms of a heart problem. These may include:  ? Chest pain or pressure. ? Severe trouble breathing. ? A fast or irregular heartbeat. ? Lightheadedness or sudden weakness. ? Coughing up pink, foamy mucus. ? Passing out. After you call 911, the  may tell you to chew 1 adult-strength or 2 to 4 low-dose aspirin. Wait for an ambulance. Do not try to drive yourself.     · You have symptoms of a stroke. These may include:  ? Sudden numbness, tingling, weakness, or loss of movement in your face, arm, or leg, especially on only one side of your body.   ? Sudden vision changes. ? Sudden trouble speaking. ? Sudden confusion or trouble understanding simple statements. ? Sudden problems with walking or balance. ? A sudden, severe headache that is different from past headaches.     · You passed out (lost consciousness) again.    Watch closely for changes in your health, and be sure to contact your doctor if:    · You do not get better as expected. Where can you learn more? Go to http://quinton-armen.info/. Enter G441 in the search box to learn more about \"Fainting: Care Instructions. \"  Current as of: September 23, 2018  Content Version: 11.9  © 0149-6450 [employer]. Care instructions adapted under license by [employer] (which disclaims liability or warranty for this information). If you have questions about a medical condition or this instruction, always ask your healthcare professional. [place] [address] any warranty or liability for your use of this information. Patient Education          Patient Education        Urinary Tract Infection in Women: Care Instructions  Your Care Instructions    A urinary tract infection, or UTI, is a general term for an infection anywhere between the kidneys and the urethra (where urine comes out). Most UTIs are bladder infections. They often cause pain or burning when you urinate. UTIs are caused by bacteria and can be cured with antibiotics. Be sure to complete your treatment so that the infection goes away. Follow-up care is a key part of your treatment and safety. Be sure to make and go to all appointments, and call your doctor if you are having problems. It's also a good idea to know your test results and keep a list of the medicines you take. How can you care for yourself at home? · Take your antibiotics as directed. Do not stop taking them just because you feel better. You need to take the full course of antibiotics.   · Drink extra water and other fluids for the next day or two. This may help wash out the bacteria that are causing the infection. (If you have kidney, heart, or liver disease and have to limit fluids, talk with your doctor before you increase your fluid intake.)  · Avoid drinks that are carbonated or have caffeine. They can irritate the bladder. · Urinate often. Try to empty your bladder each time. · To relieve pain, take a hot bath or lay a heating pad set on low over your lower belly or genital area. Never go to sleep with a heating pad in place. To prevent UTIs  · Drink plenty of water each day. This helps you urinate often, which clears bacteria from your system. (If you have kidney, heart, or liver disease and have to limit fluids, talk with your doctor before you increase your fluid intake.)  · Urinate when you need to. · Urinate right after you have sex. · Change sanitary pads often. · Avoid douches, bubble baths, feminine hygiene sprays, and other feminine hygiene products that have deodorants. · After going to the bathroom, wipe from front to back. When should you call for help? Call your doctor now or seek immediate medical care if:    · Symptoms such as fever, chills, nausea, or vomiting get worse or appear for the first time.     · You have new pain in your back just below your rib cage. This is called flank pain.     · There is new blood or pus in your urine.     · You have any problems with your antibiotic medicine.    Watch closely for changes in your health, and be sure to contact your doctor if:    · You are not getting better after taking an antibiotic for 2 days.     · Your symptoms go away but then come back. Where can you learn more? Go to http://quinton-armen.info/. Enter S431 in the search box to learn more about \"Urinary Tract Infection in Women: Care Instructions. \"  Current as of: March 20, 2018  Content Version: 11.9  © 1412-9898 [employer].  Care instructions adapted under license by [employer] (which disclaims liability or warranty for this information). If you have questions about a medical condition or this instruction, always ask your healthcare professional. [address] any warranty or liability for your use of this information.

## 2019-03-11 NOTE — ED TRIAGE NOTES
According to EMS pt had a GLF in which pt was lowered to the ground and hitting her shoulder on the way down. Pt has hx of dementia and holding her rt shoulder. Pt moves all extremities.

## 2019-03-11 NOTE — ED NOTES
Pt here for evaluation after GLF. Denies LOC. Pt was lowered to the ground by grand daughter. Emergency Department Nursing Plan of Care       The Nursing Plan of Care is developed from the Nursing assessment and Emergency Department Attending provider initial evaluation. The plan of care may be reviewed in the ED Provider note.     The Plan of Care was developed with the following considerations:   Patient / Family readiness to learn indicated by:verbalized understanding  Persons(s) to be included in education: patient and family  Barriers to Learning/Limitations:No    Signed     Harmeet Prater RN    3/11/2019   2:35 PM

## 2019-03-11 NOTE — ED NOTES
PIV remove intact and plan of care accepted by family. All parties left unit steady gait. Patient (s)  given copy of dc instructions and 1 script(s). Patient (s)  verbalized understanding of instructions and script (s). Patient given a current medication reconciliation form and verbalized understanding of their medications. Patient (s) verbalized understanding of the importance of discussing medications with  his or her physician or clinic they will be following up with. Patient alert and oriented and in no acute distress. Patient discharged home ambulatory with family.

## 2019-03-11 NOTE — ED PROVIDER NOTES
EMERGENCY DEPARTMENT HISTORY AND PHYSICAL EXAM      Date: 3/11/2019  Patient Name: Prince Stevens    History of Presenting Illness     Chief Complaint   Patient presents with   [facility] Fall     GLF control decent     History Provided By: Patient's Daughter and EMS    HPI: Prince Stevens, 70 y.o. female with no PMHx presents via EMS to the ED with cc of syncope prior to arrival.  Per daughter, patient has had diarrhea multiple times this morning and was getting up from the bathroom and walked out into the hallway, took a few staggering steps, then fell to the floor. Patient hit her head on the door frame. Her daughter, she had already passed out prior to hitting her head on the door. Patient's daughter states that she has undiagnosed dementia, because she refuses to go to the primary care doctor. Patient's daughter states that multiple family members have had the same GI symptoms over the past few days (both son-in-law and daughter) and Ms. Everett's diarrhea started this morning. Patient's daughter denies any vomiting, fevers, cough, or urinary frequency. PMHx: None  PSHx: None  Social Hx: Denies EtOH; Non-smoker; Denies illicit Drugs    PCP: None    There are no other complaints, changes, or physical findings at this time. No current facility-administered medications on file prior to encounter. Current Outpatient Medications on File Prior to Encounter   Medication Sig Dispense Refill    polyethylene glycol (MIRALAX) 17 gram/dose powder Take 17 g by mouth daily. 289 g 0    ASPIRIN/ACETAMINOPHEN/CAFFEINE (EXCEDRIN MIGRAINE PO) Take 1 Tab by mouth as needed. Past History     Past Medical History:  Past Medical History:   Diagnosis Date    Anemia     Transient global amnesia     dx 4 years ago.      Past Surgical History:  Past Surgical History:   Procedure Laterality Date    HX CATARACT REMOVAL      Bilateral    HX CHOLECYSTECTOMY      HX HYSTERECTOMY      HX KNEE ARTHROSCOPY      Right Family History:  History reviewed. No pertinent family history. Social History:  Social History     Tobacco Use    Smoking status: Never Smoker    Smokeless tobacco: Never Used   Substance Use Topics    Alcohol use: No    Drug use: No     Allergies:  No Known Allergies  Review of Systems   Review of Systems   Constitutional: Negative for chills and fever. HENT: Negative for congestion, rhinorrhea, sneezing and sore throat. Respiratory: Negative for shortness of breath. Cardiovascular: Negative for chest pain. Gastrointestinal: Positive for diarrhea. Negative for abdominal pain, nausea and vomiting. Musculoskeletal: Negative for back pain, myalgias and neck stiffness. Skin: Negative for rash. Neurological: Positive for syncope. Negative for facial asymmetry, weakness and headaches. Psychiatric/Behavioral: Positive for confusion. All other systems reviewed and are negative. Physical Exam   Physical Exam   Constitutional: She is oriented to person, place, and time. She appears well-developed and well-nourished. Incontinent of stool   HENT:   Head: Normocephalic and atraumatic. Mouth/Throat: Oropharynx is clear and moist.   Eyes: Conjunctivae and EOM are normal.   Neck: Normal range of motion and full passive range of motion without pain. Neck supple. Cardiovascular: Normal rate, regular rhythm, S1 normal, S2 normal, normal heart sounds, intact distal pulses and normal pulses. No murmur heard. Pulmonary/Chest: Effort normal and breath sounds normal. No respiratory distress. She has no wheezes. Abdominal: Soft. Normal appearance and bowel sounds are normal. She exhibits no distension. There is no tenderness. There is no rebound. Musculoskeletal: Normal range of motion. Neurological: She is alert and oriented to person, place, and time. She has normal strength. No cranial nerve deficit. Skin: Skin is warm, dry and intact. No rash noted.    Psychiatric: She has a normal mood and affect. Her speech is normal. She exhibits abnormal recent memory. Pleasantly demented She is inattentive. Nursing note and vitals reviewed. Diagnostic Study Results   Labs -   No results found for this or any previous visit (from the past 12 hour(s)). Radiologic Studies -   No orders to display     No results found. Medical Decision Making   I am the first provider for this patient. I reviewed the vital signs, available nursing notes, past medical history, past surgical history, family history and social history. Vital Signs-Reviewed the patient's vital signs. Patient Vitals for the past 12 hrs:   Temp Pulse Resp BP SpO2   03/11/19 1408 98 °F (36.7 °C) 98 18 133/77 99 %     Pulse Oximetry Analysis - 99% on RA    Cardiac Monitor:   Rate: 98 bpm  Rhythm: Normal Sinus Rhythm     ED EKG interpretation: 15:04  Rhythm: sinus bradycardia; and regular . Rate (approx.): 56; Axis: normal; P wave: normal; QRS interval: normal ; ST/T wave: normal; Other findings: normal. This EKG was interpreted by Ritchie Sandoval MD,ED Provider. Records Reviewed: Nursing Notes and Ambulance Run Sheet    Provider Notes (Medical Decision Making):   70-year-old female with diarrhea this morning presents to the emergency department after a syncopal episode. Likely vasovagal syncope as this happened right after she got up from the toilet. Will check labs to assess for dehydration and anemia; treat with IV fluids, and reassess. Patient has a benign abdominal exam, will not image at this time. ED Course:   Initial assessment performed. The patients presenting problems have been discussed, and they are in agreement with the care plan formulated and outlined with them. I have encouraged them to ask questions as they arise throughout their visit. PROGRESS NOTE:  5:17 PM  UA shows likely infection. Will treat with abx and dc home. Written by Yao Mascorro ED Scribe, as dictated by Luke Sanchez.  MD Wilman. Progress Note:   5:29 PM    Updated pt on all returned results and findings. Discussed the importance of proper follow up as referred below along with return precautions. Pt in agreement with the care plan and expresses agreement with and understanding of all items discussed. Disposition:  discharge    PLAN:  1. Current Discharge Medication List      START taking these medications    Details   cephALEXin (KEFLEX) 500 mg capsule Take 1 Cap by mouth three (3) times daily for 10 days. Qty: 30 Cap, Refills: 0           2. Follow-up Information     Follow up With Specialties Details Why [address] [facility]  Call to arrange primary care [address]    Codi Suárez MD Internal Medicine On 4/2/2019 Appointment scheduled for 10 am. Please arrive 30 mins early with photo ID, insurance card, and list of medications  [place] [address]  [address]      Wilmer Garcia MD Psychiatry  Please follow-up with psychiatry as soon as possible. [facility] [address] [phone]          Return to ED if worse     Diagnosis     Clinical Impression:   1. Gastroenteritis, acute    2. Syncope and collapse    3. Dementia without behavioral disturbance, unspecified dementia type    4. Urinary tract infection without hematuria, site unspecified          This note will not be viewable in [facility].

## 2019-03-12 LAB
ATRIAL RATE: 56 BPM
CALCULATED P AXIS, ECG09: -5 DEGREES
CALCULATED R AXIS, ECG10: 5 DEGREES
CALCULATED T AXIS, ECG11: 36 DEGREES
DIAGNOSIS, 93000: NORMAL
P-R INTERVAL, ECG05: 162 MS
Q-T INTERVAL, ECG07: 412 MS
QRS DURATION, ECG06: 70 MS
QTC CALCULATION (BEZET), ECG08: 397 MS
VENTRICULAR RATE, ECG03: 56 BPM

## 2019-03-13 LAB
BACTERIA SPEC CULT: ABNORMAL
CC UR VC: ABNORMAL
SERVICE CMNT-IMP: ABNORMAL

## 2019-04-02 ENCOUNTER — OFFICE VISIT (OUTPATIENT)
Dept: INTERNAL MEDICINE CLINIC | Age: 79
End: 2019-04-02

## 2019-04-02 VITALS — HEART RATE: 72 BPM | HEIGHT: 65 IN | WEIGHT: 215.1 LBS | BODY MASS INDEX: 35.84 KG/M2 | OXYGEN SATURATION: 96 %

## 2019-04-02 DIAGNOSIS — I87.2 VENOUS INSUFFICIENCY: ICD-10-CM

## 2019-04-02 DIAGNOSIS — G30.1 LATE ONSET ALZHEIMER'S DISEASE WITH BEHAVIORAL DISTURBANCE (HCC): Primary | ICD-10-CM

## 2019-04-02 DIAGNOSIS — E66.01 SEVERE OBESITY (HCC): ICD-10-CM

## 2019-04-02 DIAGNOSIS — F02.818 LATE ONSET ALZHEIMER'S DISEASE WITH BEHAVIORAL DISTURBANCE (HCC): Primary | ICD-10-CM

## 2019-04-02 DIAGNOSIS — M17.0 PRIMARY OSTEOARTHRITIS OF BOTH KNEES: ICD-10-CM

## 2019-04-02 NOTE — PROGRESS NOTES
Chief Complaint Patient presents with  New Patient  
  establish care, chronic leg and arm pain, concerns of dementia. 1. Have you been to the ER, urgent care clinic since your last visit? Hospitalized since your last visit? Yes When: 3/11/19 Where: [facility] Reason for visit: fall 2. Have you seen or consulted any other health care providers outside of the [address] [place] [address] since your last visit? Include any pap smears or colon screening.  No

## 2019-04-04 ENCOUNTER — TELEPHONE (OUTPATIENT)
Dept: INTERNAL MEDICINE CLINIC | Age: 79
End: 2019-04-04

## 2019-04-07 PROBLEM — M17.0 PRIMARY OSTEOARTHRITIS OF BOTH KNEES: Status: ACTIVE | Noted: 2019-04-07

## 2019-04-07 PROBLEM — I87.2 VENOUS INSUFFICIENCY: Status: ACTIVE | Noted: 2019-04-07

## 2019-04-07 PROBLEM — F02.818 LATE ONSET ALZHEIMER'S DISEASE WITH BEHAVIORAL DISTURBANCE (HCC): Status: ACTIVE | Noted: 2019-04-07

## 2019-04-07 PROBLEM — G30.1 LATE ONSET ALZHEIMER'S DISEASE WITH BEHAVIORAL DISTURBANCE (HCC): Status: ACTIVE | Noted: 2019-04-07

## 2019-04-07 NOTE — PROGRESS NOTES
[address] [facility] 
[place] [address] 
[address] [facility] [address] Phone:  [phone]  Fax: [phone] Chief Complaint Patient presents with  New Patient  
  establish care, chronic leg and arm pain, concerns of dementia. .   
 
SUBJECTIVE: 
  Ibrahima Horan is a 70 y.o. female Comes in accompanied by her daughter. No history could be obtained from the patient because she is confused. She is not only confused, but agitated and totally uncooperative. According to the daughter, her short-term memory is very poor and she is totally uncooperative at home. Patient complains of pain in her knees. No other meaningful information could be obtained from the patient. Current Outpatient Medications Medication Sig Dispense Refill  polyethylene glycol (MIRALAX) 17 gram/dose powder Take 17 g by mouth daily. 289 g 0  
 ASPIRIN/ACETAMINOPHEN/CAFFEINE (EXCEDRIN MIGRAINE PO) Take 1 Tab by mouth as needed. Past Medical History:  
Diagnosis Date  Anemia  Transient global amnesia   
 dx 4 years ago. Past Surgical History:  
Procedure Laterality Date  HX CATARACT REMOVAL Bilateral  
 HX CHOLECYSTECTOMY  HX HYSTERECTOMY  HX KNEE ARTHROSCOPY Right No Known Allergies REVIEW OF SYSTEMS: 
Unobtainable Social History Socioeconomic History  Marital status: SINGLE Spouse name: Not on file  Number of children: Not on file  Years of education: Not on file  Highest education level: Not on file Tobacco Use  Smoking status: Never Smoker  Smokeless tobacco: Never Used Substance and Sexual Activity  Alcohol use: No  
 Drug use: No  
 Sexual activity: Not Currently History reviewed. No pertinent family history. OBJECTIVE: 
 
Visit Vitals Pulse 72 Ht 5' 5\" (1.651 m) Wt 215 lb 1.6 oz (97.6 kg) SpO2 96% BMI 35.79 kg/m² CONSTITUTIONAL: well , well nourished, appears age appropriate, uncooperative EYES: perrla, eom intact ENMT:moist mucous membranes, pharynx clear NECK: supple. Thyroid normal 
RESPIRATORY: Chest: clear to ascultation and percussion CARDIOVASCULAR: Heart: regular rate and rhythm GASTROINTESTINAL: Abdomen: soft, bowel sounds active, inadequate exam 
HEMATOLOGIC: no pathological lymph nodes palpated MUSCULOSKELETAL: Extremities: no edema, pulse 1+, moderate degenerative changes noted both knees right greater than left INTEGUMENT: No unusual rashes or suspicious skin lesions noted. Nails appear normal. 
NEUROLOGIC: non-focal exam--- inadequate exam  
MENTAL STATUS: alert, confused and agitated, inappropriate behavior ASSESSMENT: 
1. Late onset Alzheimer's disease with behavioral disturbance 2. Severe obesity ([address] 3. Primary osteoarthritis of both knees 4. Venous insufficiency PLAN: 
 
1. The patient has progressive dementia with behavioral problems. She needs to see a neurologist and a psychiatrist. 
2. Her obesity is a problem as it relates primarily to her osteoarthritis. Any meaningful suggestions are through her daughter. I suggest that she minimize her consumption of processed carbohydrates, but I am sure this will not go over well with the patient. 3. She has progressive osteoarthritis of both knees, right much greater than the left. There is not much that can be done other than weight reduction. I will not give her any type of NSAID until I have her lab work back. 4. She does have mild swelling of her lower extremities, most likely related to venous insufficiency. 5. The patient refused to have lab work done. Follow-up and Dispositions · Return if symptoms worsen or fail to improve.  
  
 
 
 
Yohan Santos MD

## 2019-05-07 ENCOUNTER — OFFICE VISIT (OUTPATIENT)
Dept: NEUROLOGY | Age: 79
End: 2019-05-07

## 2019-05-07 VITALS — WEIGHT: 206 LBS | HEIGHT: 65 IN | BODY MASS INDEX: 34.32 KG/M2

## 2019-05-07 DIAGNOSIS — F02.818 LATE ONSET ALZHEIMER'S DISEASE WITH BEHAVIORAL DISTURBANCE (HCC): Primary | ICD-10-CM

## 2019-05-07 DIAGNOSIS — G30.1 LATE ONSET ALZHEIMER'S DISEASE WITH BEHAVIORAL DISTURBANCE (HCC): Primary | ICD-10-CM

## 2019-05-07 DIAGNOSIS — F40.240 CLAUSTROPHOBIA: ICD-10-CM

## 2019-05-07 RX ORDER — LORAZEPAM 1 MG/1
1 TABLET ORAL ONCE
Qty: 1 TAB | Refills: 0 | Status: SHIPPED | OUTPATIENT
Start: 2019-05-07 | End: 2019-05-07

## 2019-05-07 RX ORDER — MELOXICAM 15 MG/1
15 TABLET ORAL DAILY
COMMUNITY
End: 2020-05-01 | Stop reason: SDUPTHER

## 2019-05-07 RX ORDER — QUETIAPINE FUMARATE 25 MG/1
25 TABLET, FILM COATED ORAL 2 TIMES DAILY
Qty: 60 TAB | Refills: 2 | Status: SHIPPED | OUTPATIENT
Start: 2019-05-07 | End: 2019-07-19 | Stop reason: SDUPTHER

## 2019-05-07 RX ORDER — DONEPEZIL HYDROCHLORIDE 10 MG/1
10 TABLET, FILM COATED ORAL
Qty: 30 TAB | Refills: 2 | Status: SHIPPED | OUTPATIENT
Start: 2019-05-07 | End: 2019-07-19 | Stop reason: SDUPTHER

## 2019-05-07 RX ORDER — DONEPEZIL HYDROCHLORIDE 5 MG/1
5 TABLET, FILM COATED ORAL
Qty: 30 TAB | Refills: 0 | Status: SHIPPED | OUTPATIENT
Start: 2019-05-07 | End: 2019-06-23 | Stop reason: CLARIF

## 2019-05-07 NOTE — PATIENT INSTRUCTIONS
1.  CT scan of the head  2. Seroquel 25 mg p.o. twice daily  3. Aricept 5 mg daily for a month and then 10 mg daily  4. Blood work today  5. Follow-up in 2 to 3 months    Office Policies  · Phone calls/patient messages:  Please allow up to 24 hours for someone in the office to contact you about your call or message. Be mindful your provider may be out of the office or your message may require further review. We encourage you to use [employer] for your messages as this is a faster, more efficient way to communicate with our office  · Medication Refills:  Prescription medications require up to 48 business hours to process. We encourage you to use [employer] for your refills. For controlled medications: Please allow up to 72 business hours to process. Certain medications may require you to  a written prescription at our office. NO narcotic/controlled medications will be prescribed after 4pm Monday through Friday or on weekends  · Form/Paperwork Completion:  Please note there is a $25 fee for all paperwork completed by our providers. We ask that you allow 7-14 business days. Pre-payment is due prior to picking up/faxing the completed form. You may also download your forms to [employer] to have your doctor print off.

## 2019-05-07 NOTE — LETTER
5/7/19 Patient: Crystal Fairbanks YOB: 1940 Date of Visit: 5/7/2019 Harper Neil MD 
[facility] [address] [facility] 7 [address] VIA In Basket Dear Harper Neil MD, Thank you for referring Ms. Crystal Fairbanks to [address] [place] [address] for evaluation. My notes for this consultation are attached. If you have questions, please do not hesitate to call me. I look forward to following your patient along with you. Sincerely, Cheyanne Whitman MD
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
## 2019-05-09 LAB
ALBUMIN SERPL-MCNC: NORMAL G/DL
ALP SERPL-CCNC: NORMAL U/L
ALT SERPL-CCNC: NORMAL U/L
AST SERPL-CCNC: NORMAL U/L
BASOPHILS # BLD AUTO: NORMAL 10*3/UL
BILIRUB SERPL-MCNC: NORMAL MG/DL
BUN SERPL-MCNC: NORMAL MG/DL
CALCIUM SERPL-MCNC: NORMAL MG/DL
CHLORIDE SERPL-SCNC: NORMAL MMOL/L
CO2 SERPL-SCNC: NORMAL MMOL/L
CREAT SERPL-MCNC: NORMAL MG/DL
EOSINOPHIL # BLD AUTO: NORMAL 10*3/UL
EOSINOPHIL NFR BLD AUTO: NORMAL %
GLUCOSE SERPL-MCNC: NORMAL MG/DL
HCT VFR BLD AUTO: NORMAL %
HGB BLD-MCNC: NORMAL G/DL
LYMPHOCYTES # BLD AUTO: NORMAL 10*3/UL
LYMPHOCYTES NFR BLD AUTO: NORMAL %
METHYLMALONATE SERPL-SCNC: NORMAL NMOL/L
MONOCYTES NFR BLD AUTO: NORMAL %
NEUTROPHILS NFR BLD AUTO: NORMAL %
PLATELET # BLD AUTO: NORMAL 10*3/UL
POTASSIUM SERPL-SCNC: NORMAL MMOL/L
PROT SERPL-MCNC: NORMAL G/DL
RBC # BLD AUTO: NORMAL 10*6/UL
SODIUM SERPL-SCNC: NORMAL MMOL/L
T4 FREE SERPL-MCNC: NORMAL NG/DL
TSH SERPL DL<=0.005 MIU/L-ACNC: NORMAL UIU/ML
VIT B12 SERPL-MCNC: NORMAL PG/ML
WBC # BLD AUTO: NORMAL X10E3/UL

## 2019-05-22 ENCOUNTER — HOSPITAL ENCOUNTER (OUTPATIENT)
Dept: CT IMAGING | Age: 79
Discharge: HOME | End: 2019-05-22
Attending: PSYCHIATRY & NEUROLOGY
Payer: MEDICARE

## 2019-05-22 DIAGNOSIS — F02.818 LATE ONSET ALZHEIMER'S DISEASE WITH BEHAVIORAL DISTURBANCE (HCC): ICD-10-CM

## 2019-05-22 DIAGNOSIS — G30.1 LATE ONSET ALZHEIMER'S DISEASE WITH BEHAVIORAL DISTURBANCE (HCC): ICD-10-CM

## 2019-05-22 PROCEDURE — 70450 CT HEAD/BRAIN W/O DYE: CPT

## 2019-06-03 ENCOUNTER — OFFICE VISIT (OUTPATIENT)
Dept: INTERNAL MEDICINE CLINIC | Age: 79
End: 2019-06-03

## 2019-06-03 ENCOUNTER — PATIENT OUTREACH (OUTPATIENT)
Dept: INTERNAL MEDICINE CLINIC | Age: 79
End: 2019-06-03

## 2019-06-03 VITALS
HEART RATE: 84 BPM | DIASTOLIC BLOOD PRESSURE: 78 MMHG | TEMPERATURE: 98.3 F | OXYGEN SATURATION: 98 % | WEIGHT: 202.4 LBS | BODY MASS INDEX: 33.72 KG/M2 | SYSTOLIC BLOOD PRESSURE: 106 MMHG | HEIGHT: 65 IN

## 2019-06-03 DIAGNOSIS — E66.01 SEVERE OBESITY (HCC): ICD-10-CM

## 2019-06-03 DIAGNOSIS — E86.0 DEHYDRATION: ICD-10-CM

## 2019-06-03 DIAGNOSIS — F02.818 LATE ONSET ALZHEIMER'S DISEASE WITH BEHAVIORAL DISTURBANCE (HCC): ICD-10-CM

## 2019-06-03 DIAGNOSIS — N39.0 URINARY TRACT INFECTION WITHOUT HEMATURIA, SITE UNSPECIFIED: Primary | ICD-10-CM

## 2019-06-03 DIAGNOSIS — G30.1 LATE ONSET ALZHEIMER'S DISEASE WITH BEHAVIORAL DISTURBANCE (HCC): ICD-10-CM

## 2019-06-03 NOTE — PROGRESS NOTES
Chief Complaint   Patient presents with   [address]     Patient is here for a Hospital follow up. Patient was seen in the Hospital for UTI. 1. Have you been to the ER, urgent care clinic since your last visit? Hospitalized since your last visit? Yes Reason for visit: Dehydration and UTI     2. Have you seen or consulted any other health care providers outside of the [address] [place] [address] since your last visit? Include any pap smears or colon screening.  Yes Where: [facility]

## 2019-06-04 NOTE — PROGRESS NOTES
Hospital Discharge Follow-Up    Date/Time:  6/3/2019:   5:36 PM    Patient was admitted to [facility] on 2019 and discharged on 2019 for UTI/Worsening Dementia. The physician discharge summary was available at the time of outreach. Patient was contacted within 2 business days of discharge. Top Challenges reviewed with the provider   Worsening dementia  ACP         Method of communication with provider :face to face     Inpatient RRAT score: none -[facility]  Was this a readmission? no   Patient stated reason for the readmission: n/a     Nurse Navigator (NN) contacted the patient at office wi daughter to perform post hospital discharge assessment. Verified name and  with patient as identifiers. Provided introduction to self, and explanation of the Nurse Navigator role. Reviewed discharge instructions and red flags with patient and daughter who verbalized understanding. Patient given an opportunity to ask questions and does not have any further questions or concerns at this time. The patient agrees to contact the PCP office for questions related to their healthcare. NN provided contact information for future reference. Disease Specific:   none    Summary of patient's top problems:  Patient admitted through ED for AMS and aggressive behaviors; treated w/ IV Abx. Patient's seroquel was increased from 25mg to 50mg PO. Home Health orders at discharge: [address]: n/a   Date of initial visit: [address] ordered/company: none  Durable Medical Equipment received: n/a     Barriers to care? Dementia worsening; dependent on family member-daughter.     Advance Care Planning:   Does patient have an Advance Directive:  not on file; education provided       Medication(s):   New Medications at Discharge: none  Changed Medications at Discharge: seroquel 25mg to 50mg   Discontinued Medications at Discharge: none    Medication reconciliation was performed with patient and daughter with PCP, who verbalizes understanding of administration of home medications. There were no barriers to obtaining medications identified at this time. Referral to Pharm D needed: no     Current Outpatient Medications   Medication Sig    meloxicam (MOBIC) 15 mg tablet Take 15 mg by mouth daily.  QUEtiapine (SEROQUEL) 25 mg tablet Take 1 Tab by mouth two (2) times a day. (Patient taking differently: Take 50 mg by mouth two (2) times a day.)    donepezil (ARICEPT) 5 mg tablet Take 1 Tab by mouth nightly.  donepezil (ARICEPT) 10 mg tablet Take 1 Tab by mouth nightly. After finishing 5 mg daily for a month    polyethylene glycol (MIRALAX) 17 gram/dose powder Take 17 g by mouth daily.  ASPIRIN/ACETAMINOPHEN/CAFFEINE (EXCEDRIN MIGRAINE PO) Take 1 Tab by mouth as needed. No current facility-administered medications for this visit. There are no discontinued medications. [facility] follow up appointment(s):   Future Appointments   Date Time Provider [place]   7/2/2019  9:30 AM Ruma Galeas MD [address] [place] [address]   8/14/2019 10:20 AM Cathy Mahajan  [facility]      Non-[facility] follow up appointment(s): none  [facility]:  n/a            Patient/Family verbalizes understanding of self-management of chronic disease. New  Assess     Notes  6/3/2019:  [facility] teaching reviewed re increased water intake working to rid UTI completeness; decrease in sugary food & sugary beverages around the house knowing patient enjoys.   Will continue to f/u.  EW                      Goal completion:  6/17/2019

## 2019-06-23 NOTE — PROGRESS NOTES
[address] [place] [address] and Primary Care  [place] [address]  [address] [facility] [address]  Phone:  [phone]  Fax: [phone]       Chief Complaint   Patient presents with   [address]     Patient is here for a Hospital follow up. Patient was seen in the Hospital for UTI. Carolynn Tavera SUBJECTIVE:    Tyler Suarez is a 70 y.o. female Comes in for return visit after being hospitalized at [facility] for a UTI complicated by dehydration. She had an uneventful recovery from the illness. She saw the neurologist, who increased Seroquel to 50 mg b.i.d., and the Aricept is now 10 mg. Her behavior has indeed improved since the Seroquel was increased. She, however, remains confused. There has been no meaningful change in her weight and her gait is a bit unsteady, primarily because of her osteoarthritic knees. Current Outpatient Medications   Medication Sig Dispense Refill    meloxicam (MOBIC) 15 mg tablet Take 15 mg by mouth daily.  QUEtiapine (SEROQUEL) 25 mg tablet Take 1 Tab by mouth two (2) times a day. (Patient taking differently: Take 50 mg by mouth two (2) times a day.) 60 Tab 2    donepezil (ARICEPT) 10 mg tablet Take 1 Tab by mouth nightly. After finishing 5 mg daily for a month 30 Tab 2    polyethylene glycol (MIRALAX) 17 gram/dose powder Take 17 g by mouth daily. 289 g 0    ASPIRIN/ACETAMINOPHEN/CAFFEINE (EXCEDRIN MIGRAINE PO) Take 1 Tab by mouth as needed. Past Medical History:   Diagnosis Date    Anemia     Arthritis     Transient global amnesia     dx 4 years ago.      Past Surgical History:   Procedure Laterality Date    HX CATARACT REMOVAL      Bilateral    HX CHOLECYSTECTOMY      HX HYSTERECTOMY      HX KNEE ARTHROSCOPY      Right     No Known Allergies      REVIEW OF SYSTEMS:  General: negative for - chills or fever  ENT: negative for - headaches, nasal congestion or tinnitus  Respiratory: negative for - cough, hemoptysis, shortness of breath or wheezing  Cardiovascular : negative for - chest pain, edema, palpitations or shortness of breath  Gastrointestinal: negative for - abdominal pain, blood in stools, heartburn or nausea/vomiting  Genito-Urinary: no dysuria, trouble voiding, or hematuria  Musculoskeletal: negative for - gait disturbance, joint pain, joint stiffness or joint swelling  Neurological: no TIA or stroke symptoms  Hematologic: no bruises, no bleeding, no swollen glands  Integument: no lumps, mole changes, nail changes or rash  Endocrine: no malaise/lethargy or unexpected weight changes      Social History     Socioeconomic History    Marital status: SINGLE     Spouse name: Not on file    Number of children: Not on file    Years of education: Not on file    Highest education level: Not on file   Tobacco Use    Smoking status: Never Smoker    Smokeless tobacco: Never Used   Substance and Sexual Activity    Alcohol use: No    Drug use: No    Sexual activity: Not Currently     History reviewed. No pertinent family history. OBJECTIVE:    Visit Vitals  /78   Pulse 84   Temp 98.3 °F (36.8 °C)   Ht 5' 5\" (1.651 m)   Wt 202 lb 6.4 oz (91.8 kg)   SpO2 98%   BMI 33.68 kg/m²     CONSTITUTIONAL: well , well nourished, appears age appropriate  EYES: perrla, eom intact  ENMT:moist mucous membranes, pharynx clear  NECK: supple. Thyroid normal  RESPIRATORY: Chest: clear to ascultation and percussion   CARDIOVASCULAR: Heart: regular rate and rhythm  GASTROINTESTINAL: Abdomen: soft, bowel sounds active  HEMATOLOGIC: no pathological lymph nodes palpated  MUSCULOSKELETAL: Extremities: no edema, pulse 1+   INTEGUMENT: No unusual rashes or suspicious skin lesions noted. Nails appear normal.  NEUROLOGIC: non-focal exam   MENTAL STATUS: alert and oriented, appropriate affect      ASSESSMENT:  1. Urinary tract infection without hematuria, site unspecified    2. Dehydration    3. Late onset Alzheimer's disease with behavioral disturbance    4.  Severe obesity ([address]        PLAN:    1. She has had complete resolution of the UTI. 2. As far as her dehydration is concerned, this also resolved. I wanted blood, but the patient refused. 3. Her Alzheimer's is about the same, although her behavior has indeed improved. She will continue Seroquel at 50 mg b.i.d.  4. Weight loss needs to occur, but this is highly unlikely because the patient has no concept and therefore motivation to lose weight and it is difficult for the family to intervene. 5. Her overall care is extremely difficult because she is totally noncompliant. Follow-up and Dispositions    · Return in about 1 month (around 7/1/2019).            Kandis Guthrie MD

## 2019-07-19 ENCOUNTER — OFFICE VISIT (OUTPATIENT)
Dept: INTERNAL MEDICINE CLINIC | Age: 79
End: 2019-07-19

## 2019-07-19 VITALS
BODY MASS INDEX: 36.93 KG/M2 | OXYGEN SATURATION: 98 % | HEIGHT: 60 IN | RESPIRATION RATE: 16 BRPM | HEART RATE: 78 BPM | WEIGHT: 188.1 LBS

## 2019-07-19 DIAGNOSIS — G30.1 LATE ONSET ALZHEIMER'S DISEASE WITH BEHAVIORAL DISTURBANCE (HCC): Primary | ICD-10-CM

## 2019-07-19 DIAGNOSIS — N39.0 URINARY TRACT INFECTION WITHOUT HEMATURIA, SITE UNSPECIFIED: ICD-10-CM

## 2019-07-19 DIAGNOSIS — M17.0 PRIMARY OSTEOARTHRITIS OF BOTH KNEES: ICD-10-CM

## 2019-07-19 DIAGNOSIS — Z00.00 WELLNESS EXAMINATION: ICD-10-CM

## 2019-07-19 DIAGNOSIS — Z13.39 SCREENING FOR ALCOHOLISM: ICD-10-CM

## 2019-07-19 DIAGNOSIS — E66.01 SEVERE OBESITY (HCC): ICD-10-CM

## 2019-07-19 DIAGNOSIS — F02.818 LATE ONSET ALZHEIMER'S DISEASE WITH BEHAVIORAL DISTURBANCE (HCC): Primary | ICD-10-CM

## 2019-07-19 DIAGNOSIS — Z13.31 SCREENING FOR DEPRESSION: ICD-10-CM

## 2019-07-19 RX ORDER — FAMOTIDINE 40 MG/1
40 TABLET, FILM COATED ORAL DAILY
Qty: 30 TAB | Refills: 11 | Status: SHIPPED | OUTPATIENT
Start: 2019-07-19

## 2019-07-19 RX ORDER — QUETIAPINE FUMARATE 25 MG/1
TABLET, FILM COATED ORAL
Qty: 180 TAB | Refills: 11 | Status: SHIPPED | OUTPATIENT
Start: 2019-07-19 | End: 2020-02-19 | Stop reason: SDUPTHER

## 2019-07-19 RX ORDER — AMOXICILLIN 250 MG/1
250 CAPSULE ORAL 3 TIMES DAILY
Qty: 21 CAP | Refills: 0 | Status: SHIPPED | OUTPATIENT
Start: 2019-07-19 | End: 2019-07-26

## 2019-07-19 RX ORDER — DONEPEZIL HYDROCHLORIDE 10 MG/1
10 TABLET, FILM COATED ORAL
Qty: 30 TAB | Refills: 2 | Status: SHIPPED | OUTPATIENT
Start: 2019-07-19 | End: 2019-10-18 | Stop reason: SDUPTHER

## 2019-07-19 NOTE — PROGRESS NOTES
Chief Complaint   Patient presents with    Annual Wellness Visit    Decreased Appetite     x 2 to 3 months      1. Have you been to the ER, urgent care clinic since your last visit? Hospitalized since your last visit? Yes When: about two months Where: [facility] Reason for visit: syncope (dehydration and UTI)    2. Have you seen or consulted any other health care providers outside of the [address] [place] [address] since your last visit? Include any pap smears or colon screening.  No

## 2019-07-20 NOTE — PROGRESS NOTES
[facility]  [place] [address]  [address] [place] [id number]  Phone:  [phone]  Fax: [phone]       Chief Complaint   Patient presents with    Annual Wellness Visit    Decreased Appetite     x 2 to 3 months    . SUBJECTIVE:    Marilyn Bravo is a 70 y.o. female Comes in accompanied by her daughter. She is totally confused as she always is. She is a bit more cooperative than usual, primarily because of Seroquel she is currently taking. Her medication was started and initially adjusted by the physicians at [facility]. According to the daughter, confusional state continues to worsen. She is most struck by her loss of appetite. She is not eating as much, although has not lost much weight. But the fact that she has not lost a significant amount of weight indicates that she is at least having a reasonable caloric intake. The daughter thinks she might have a UTI because she is passing malodorous urine. Her agitation is getting a bit worse and she wonders if increasing the Seroquel will make a difference. Current Outpatient Medications   Medication Sig Dispense Refill    amoxicillin (AMOXIL) 250 mg capsule Take 1 Cap by mouth three (3) times daily for 7 days. 21 Cap 0    famotidine (PEPCID) 40 mg tablet Take 1 Tab by mouth daily. 30 Tab 11    QUEtiapine (SEROQUEL) 25 mg tablet 3 tablets two times a day 180 Tab 11    donepezil (ARICEPT) 10 mg tablet Take 1 Tab by mouth nightly. After finishing 5 mg daily for a month 30 Tab 2    meloxicam (MOBIC) 15 mg tablet Take 15 mg by mouth daily.  polyethylene glycol (MIRALAX) 17 gram/dose powder Take 17 g by mouth daily. 289 g 0    ASPIRIN/ACETAMINOPHEN/CAFFEINE (EXCEDRIN MIGRAINE PO) Take 1 Tab by mouth as needed. Past Medical History:   Diagnosis Date    Anemia     Arthritis     Transient global amnesia     dx 4 years ago.      Past Surgical History:   Procedure Laterality Date    HX CATARACT REMOVAL      Bilateral    HX CHOLECYSTECTOMY      HX HYSTERECTOMY      HX KNEE ARTHROSCOPY      Right     No Known Allergies      REVIEW OF SYSTEMS:  Unobtainable      Social History     Socioeconomic History    Marital status: SINGLE     Spouse name: Not on file    Number of children: Not on file    Years of education: Not on file    Highest education level: Not on file   Tobacco Use    Smoking status: Never Smoker    Smokeless tobacco: Never Used   Substance and Sexual Activity    Alcohol use: No    Drug use: No    Sexual activity: Not Currently     No family history on file. OBJECTIVE:    Visit Vitals  Pulse 78   Resp 16   Ht 5' (1.524 m)   Wt 188 lb 1.6 oz (85.3 kg)   SpO2 98%   BMI 36.74 kg/m²     CONSTITUTIONAL: well , well nourished, appears age appropriate, confused  EYES: perrla, eom intact  ENMT:moist mucous membranes, pharynx clear  NECK: supple. Thyroid normal  RESPIRATORY: Chest: clear to ascultation and percussion   CARDIOVASCULAR: Heart: regular rate and rhythm  GASTROINTESTINAL: Abdomen: soft, bowel sounds active  HEMATOLOGIC: no pathological lymph nodes palpated  MUSCULOSKELETAL: Extremities: no edema, pulse 1+, mild degenerative changes noted both knees  INTEGUMENT: No unusual rashes or suspicious skin lesions noted. Nails appear normal.  NEUROLOGIC: non-focal exam   MENTAL STATUS: alert and oriented, appropriate affect      ASSESSMENT:  1. Late onset Alzheimer's disease with behavioral disturbance    2. Primary osteoarthritis of both knees    3. Severe obesity ([address]    4. Urinary tract infection without hematuria, site unspecified        PLAN:    1. Alzheimer's is obviously gradually getting worse. The duration of time is at least 5+ years. The daughter went back over her possible onset and it is at least five years. I will increase her Seroquel to three tablets twice a day to see if this makes a difference.   I explained to the daughter that there is a fine line between attempting to control behavior versus sedation with these types of medications. 2. She has severe osteoarthritis of both knees, which makes walking extremely difficulty. Fortunately she has not fallen. She has been taking Meloxicam, which has helped to the point where she no longer complains of pain in her knees. 3. Her obesity persists. As far as her presumed loss of appetite perceived by her daughter, I will give her Pepcid 40 mg daily, suspecting that this might be on the basis of mild gastritis induced by the Meloxicam.  4. Because of the passage of malodorous urine, she will be given an antibiotic empirically. 5. The daughter is quite frustrated. .  Orders Placed This Encounter    amoxicillin (AMOXIL) 250 mg capsule    famotidine (PEPCID) 40 mg tablet    QUEtiapine (SEROQUEL) 25 mg tablet    donepezil (ARICEPT) 10 mg tablet         Follow-up and Dispositions    · Return in about 3 months (around 10/19/2019). Carolyn Camacho MD  This is the Subsequent Medicare Annual Wellness Exam, performed 12 months or more after the Initial AWV or the last Subsequent AWV    I have reviewed the patient's medical history in detail and updated the computerized patient record. History     Past Medical History:   Diagnosis Date    Anemia     Arthritis     Transient global amnesia     dx 4 years ago. Past Surgical History:   Procedure Laterality Date    HX CATARACT REMOVAL      Bilateral    HX CHOLECYSTECTOMY      HX HYSTERECTOMY      HX KNEE ARTHROSCOPY      Right     Current Outpatient Medications   Medication Sig Dispense Refill    amoxicillin (AMOXIL) 250 mg capsule Take 1 Cap by mouth three (3) times daily for 7 days. 21 Cap 0    famotidine (PEPCID) 40 mg tablet Take 1 Tab by mouth daily. 30 Tab 11    QUEtiapine (SEROQUEL) 25 mg tablet 3 tablets two times a day 180 Tab 11    donepezil (ARICEPT) 10 mg tablet Take 1 Tab by mouth nightly.  After finishing 5 mg daily for a month 30 Tab 2  meloxicam (MOBIC) 15 mg tablet Take 15 mg by mouth daily.  polyethylene glycol (MIRALAX) 17 gram/dose powder Take 17 g by mouth daily. 289 g 0    ASPIRIN/ACETAMINOPHEN/CAFFEINE (EXCEDRIN MIGRAINE PO) Take 1 Tab by mouth as needed. No Known Allergies  No family history on file. Social History     Tobacco Use    Smoking status: Never Smoker    Smokeless tobacco: Never Used   Substance Use Topics    Alcohol use: No     Patient Active Problem List   Diagnosis Code    Severe obesity ([facility] [address] E66.01    Late onset Alzheimer's disease with behavioral disturbance G30.1, F02.81    Primary osteoarthritis of both knees M17.0    Venous insufficiency I87.2       Depression Risk Factor Screening:     3 most recent PHQ Screens 6/3/2019   Little interest or pleasure in doing things Not at all   Feeling down, depressed, irritable, or hopeless Not at all   Total Score PHQ 2 0     Alcohol Risk Factor Screening: You do not drink alcohol or very rarely. Functional Ability and Level of Safety:   Hearing Loss  Hearing is good. Activities of Daily Living  The home contains: no safety equipment. Patient needs help with:  phone, transportation, shopping, preparing meals, laundry, housework, managing medications, managing money, eating, dressing, bathing, hygiene, bathroom needs and walking    Fall Risk  Fall Risk Assessment, last 12 mths 6/3/2019   Able to walk? Yes   Fall in past 12 months?  No   Fall with injury? -   Number of falls in past 12 months -   Fall Risk Score -       Abuse Screen  Patient is not abused    Cognitive Screening   Evaluation of Cognitive Function:  Has your family/caregiver stated any concerns about your memory: yes  abnormal    Patient Care Team   Patient Care Team:  Skylar Nuñez MD as PCP - General (Internal Medicine)  Kushal Leslie RN as Ambulatory Care Navigator (General Practice)    Assessment/Plan   Education and counseling provided:  Are appropriate based on today's review and evaluation    Diagnoses and all orders for this visit:    1. Late onset Alzheimer's disease with behavioral disturbance  -     QUEtiapine (SEROQUEL) 25 mg tablet; 3 tablets two times a day  -     donepezil (ARICEPT) 10 mg tablet; Take 1 Tab by mouth nightly. After finishing 5 mg daily for a month    2. Primary osteoarthritis of both knees    3. Severe obesity ([address]    4. Urinary tract infection without hematuria, site unspecified    Other orders  -     amoxicillin (AMOXIL) 250 mg capsule; Take 1 Cap by mouth three (3) times daily for 7 days. -     famotidine (PEPCID) 40 mg tablet; Take 1 Tab by mouth daily.         Health Maintenance Due   Topic Date Due    GLAUCOMA SCREENING Q2Y  08/29/2007

## 2019-07-20 NOTE — PATIENT INSTRUCTIONS
Medicare Wellness Visit, Female     The best way to live healthy is to have a lifestyle where you eat a well-balanced diet, exercise regularly, limit alcohol use, and quit all forms of tobacco/nicotine, if applicable. Regular preventive services are another way to keep healthy. Preventive services (vaccines, screening tests, monitoring & exams) can help personalize your care plan, which helps you manage your own care. Screening tests can find health problems at the earliest stages, when they are easiest to treat. [facility] follows the current, evidence-based guidelines published by the [facility] ([facility]) when recommending preventive services for our patients. Because we follow these guidelines, sometimes recommendations change over time as research supports it. (For example, mammograms used to be recommended annually. Even though Medicare will still pay for an annual mammogram, the newer guidelines recommend a mammogram every two years for women of average risk.)  Of course, you and your doctor may decide to screen more often for some diseases, based on your risk and your health status. Preventive services for you include:  - Medicare offers their members a free annual wellness visit, which is time for you and your primary care provider to discuss and plan for your preventive service needs. Take advantage of this benefit every year!  -All adults over the age of 72 should receive the recommended pneumonia vaccines. Current USPSTF guidelines recommend a series of two vaccines for the best pneumonia protection.   -All adults should have a flu vaccine yearly and a tetanus vaccine every 10 years. All adults age 61 and older should receive a shingles vaccine once in their lifetime.    -A bone mass density test is recommended when a woman turns 65 to screen for osteoporosis. This test is only recommended one time, as a screening.  Some providers will use this same test as a disease monitoring tool if you already have osteoporosis. -All adults age 38-68 who are overweight should have a diabetes screening test once every three years.   -Other screening tests and preventive services for persons with diabetes include: an eye exam to screen for diabetic retinopathy, a kidney function test, a foot exam, and stricter control over your cholesterol.   -Cardiovascular screening for adults with routine risk involves an electrocardiogram (ECG) at intervals determined by your doctor.   -Colorectal cancer screenings should be done for adults age 54-65 with no increased risk factors for colorectal cancer. There are a number of acceptable methods of screening for this type of cancer. Each test has its own benefits and drawbacks. Discuss with your doctor what is most appropriate for you during your annual wellness visit. The different tests include: colonoscopy (considered the best screening method), a fecal occult blood test, a fecal DNA test, and sigmoidoscopy. -Breast cancer screenings are recommended every other year for women of normal risk, age 54-69.  -Cervical cancer screenings for women over age 72 are only recommended with certain risk factors.   -All adults born between [facility] should be screened once for Hepatitis C.      Here is a list of your current Health Maintenance items (your personalized list of preventive services) with a due date:  Health Maintenance Due   Topic Date Due    Glaucoma Screening   08/29/2007

## 2019-10-18 ENCOUNTER — OFFICE VISIT (OUTPATIENT)
Dept: INTERNAL MEDICINE CLINIC | Age: 79
End: 2019-10-18

## 2019-10-18 DIAGNOSIS — N39.0 URINARY TRACT INFECTION WITHOUT HEMATURIA, SITE UNSPECIFIED: Primary | ICD-10-CM

## 2019-10-18 DIAGNOSIS — F02.818 LATE ONSET ALZHEIMER'S DISEASE WITH BEHAVIORAL DISTURBANCE (HCC): ICD-10-CM

## 2019-10-18 DIAGNOSIS — G30.1 LATE ONSET ALZHEIMER'S DISEASE WITH BEHAVIORAL DISTURBANCE (HCC): ICD-10-CM

## 2019-10-18 RX ORDER — CEFUROXIME AXETIL 500 MG/1
500 TABLET ORAL 2 TIMES DAILY
Qty: 14 TAB | Refills: 0 | Status: SHIPPED | OUTPATIENT
Start: 2019-10-18 | End: 2020-02-12 | Stop reason: CLARIF

## 2019-10-18 RX ORDER — DONEPEZIL HYDROCHLORIDE 5 MG/1
10 TABLET, FILM COATED ORAL
Qty: 60 TAB | Refills: 11 | Status: SHIPPED | OUTPATIENT
Start: 2019-10-18 | End: 2020-07-11 | Stop reason: CLARIF

## 2019-10-18 NOTE — PROGRESS NOTES
Chief Complaint   Patient presents with    Dementia     Patient's daughter states that patient is getting worse. 1. Have you been to the ER, urgent care clinic since your last visit? Hospitalized since your last visit? No    2. Have you seen or consulted any other health care providers outside of the [address] [place] [address] since your last visit? Include any pap smears or colon screening. No     Unable to obtain any vitals on patient.

## 2019-10-19 NOTE — PROGRESS NOTES
Chief Complaint   Patient presents with    Dementia     Patient's daughter states that patient is getting worse. .      SUBECTIVE:    Michell Garcia is a 70 y.o. female Comes in, being brought in by her daughter, agitated and confused. This has worsened in the last several weeks. The daughter notes that she has been passing malodorous urine. Absolutely no history could be obtained from the patient. She was totally uncooperative and would not even let me touch her. Current Outpatient Medications   Medication Sig Dispense Refill    donepezil (ARICEPT) 5 mg tablet Take 2 Tabs by mouth nightly. After finishing 5 mg daily for a month 60 Tab 11    cefUROXime (CEFTIN) 500 mg tablet Take 1 Tab by mouth two (2) times a day. 14 Tab 0    famotidine (PEPCID) 40 mg tablet Take 1 Tab by mouth daily. 30 Tab 11    QUEtiapine (SEROQUEL) 25 mg tablet 3 tablets two times a day 180 Tab 11    meloxicam (MOBIC) 15 mg tablet Take 15 mg by mouth daily.  polyethylene glycol (MIRALAX) 17 gram/dose powder Take 17 g by mouth daily. 289 g 0    ASPIRIN/ACETAMINOPHEN/CAFFEINE (EXCEDRIN MIGRAINE PO) Take 1 Tab by mouth as needed. Past Medical History:   Diagnosis Date    Anemia     Arthritis     Transient global amnesia     dx 4 years ago. Past Surgical History:   Procedure Laterality Date    HX CATARACT REMOVAL      Bilateral    HX CHOLECYSTECTOMY      HX HYSTERECTOMY      HX KNEE ARTHROSCOPY      Right     No Known Allergies    REVIEW OF SYSTEMS:  Unobtainable      Social History     Socioeconomic History    Marital status: SINGLE     Spouse name: Not on file    Number of children: Not on file    Years of education: Not on file    Highest education level: Not on file   Tobacco Use    Smoking status: Never Smoker    Smokeless tobacco: Never Used   Substance and Sexual Activity    Alcohol use: No    Drug use: No    Sexual activity: Not Currently   r  History reviewed.  No pertinent family history. OBJECTIVE:  There were no vitals taken for this visit. Pt refused exam        ASSESSMENT:  1. Urinary tract infection without hematuria, site unspecified    2. Late onset Alzheimer's disease with behavioral disturbance ([facility]        PLAN:    1. The patient might have a UTI. Empiric treatment with Ceftin 500 mg b.i.d.  2. For Alzheimer's, I will increase Aricept to 10 mg daily. .  Orders Placed This Encounter    donepezil (ARICEPT) 5 mg tablet    cefUROXime (CEFTIN) 500 mg tablet       Follow-up and Dispositions    · Return in about 3 months (around 1/18/2020).            Phong Cloud MD

## 2019-11-19 RX ORDER — AMOXICILLIN 500 MG/1
500 CAPSULE ORAL 3 TIMES DAILY
Qty: 21 CAP | Refills: 0 | Status: SHIPPED | OUTPATIENT
Start: 2019-11-19 | End: 2019-11-26

## 2020-01-21 ENCOUNTER — OFFICE VISIT (OUTPATIENT)
Dept: INTERNAL MEDICINE CLINIC | Age: 80
End: 2020-01-21

## 2020-01-21 VITALS — HEIGHT: 60 IN | BODY MASS INDEX: 36.74 KG/M2

## 2020-01-21 DIAGNOSIS — E66.01 SEVERE OBESITY (HCC): ICD-10-CM

## 2020-01-21 DIAGNOSIS — M17.0 PRIMARY OSTEOARTHRITIS OF BOTH KNEES: ICD-10-CM

## 2020-01-21 DIAGNOSIS — G30.1 LATE ONSET ALZHEIMER'S DISEASE WITH BEHAVIORAL DISTURBANCE (HCC): Primary | ICD-10-CM

## 2020-01-21 DIAGNOSIS — F02.818 LATE ONSET ALZHEIMER'S DISEASE WITH BEHAVIORAL DISTURBANCE (HCC): Primary | ICD-10-CM

## 2020-01-21 NOTE — PROGRESS NOTES
Chief Complaint   Patient presents with    Medication Evaluation     Pt's daughter presents with patient to discuss increasing serquel or changing medication. 1. Have you been to the ER, urgent care clinic since your last visit? Hospitalized since your last visit? No    2. Have you seen or consulted any other health care providers outside of the [address] [place] [address] since your last visit? Include any pap smears or colon screening.  No

## 2020-01-26 RX ORDER — CHLORDIAZEPOXIDE HYDROCHLORIDE 10 MG/1
10 CAPSULE, GELATIN COATED ORAL
Qty: 30 CAP | Refills: 1 | Status: SHIPPED | OUTPATIENT
Start: 2020-01-26

## 2020-01-26 NOTE — PROGRESS NOTES
Chief Complaint   Patient presents with    Medication Evaluation   . SUBECTIVE:    Ranjith Tavarez is a 70 y.o. female Comes in for return visit accompanied by her daughter. The patient is totally confused and combative and refuses to have anyone touch her or communicate. All the conversation is with her daughter. Apparently her confusional state persists, as well as uncooperative status. She has been refused admission to nursing homes because of her behavior. According to the daughter, she is no worse, although she would like to consider increasing Seroquel to attempt to reduce further agitation. She has had no  symptoms according to the daughter. Current Outpatient Medications   Medication Sig Dispense Refill    chlordiazePOXIDE (LIBRIUM) 10 mg capsule Take 1 Cap by mouth three (3) times daily as needed for Anxiety. Max Daily Amount: 30 mg. 30 Cap 1    donepezil (ARICEPT) 5 mg tablet Take 2 Tabs by mouth nightly. After finishing 5 mg daily for a month 60 Tab 11    famotidine (PEPCID) 40 mg tablet Take 1 Tab by mouth daily. 30 Tab 11    QUEtiapine (SEROQUEL) 25 mg tablet 3 tablets two times a day 180 Tab 11    meloxicam (MOBIC) 15 mg tablet Take 15 mg by mouth daily.  cefUROXime (CEFTIN) 500 mg tablet Take 1 Tab by mouth two (2) times a day. 14 Tab 0    polyethylene glycol (MIRALAX) 17 gram/dose powder Take 17 g by mouth daily. 289 g 0    ASPIRIN/ACETAMINOPHEN/CAFFEINE (EXCEDRIN MIGRAINE PO) Take 1 Tab by mouth as needed. Past Medical History:   Diagnosis Date    Anemia     Arthritis     Transient global amnesia     dx 4 years ago.      Past Surgical History:   Procedure Laterality Date    HX CATARACT REMOVAL      Bilateral    HX CHOLECYSTECTOMY      HX HYSTERECTOMY      HX KNEE ARTHROSCOPY      Right     No Known Allergies    REVIEW OF SYSTEMS:  Unobtainable      Social History     Socioeconomic History    Marital status: SINGLE     Spouse name: Not on file    Number of children: Not on file    Years of education: Not on file    Highest education level: Not on file   Tobacco Use    Smoking status: Never Smoker    Smokeless tobacco: Never Used   Substance and Sexual Activity    Alcohol use: No    Drug use: No    Sexual activity: Not Currently   r  No family history on file. OBJECTIVE:  Visit Vitals  Ht 5' (1.524 m)   BMI 36.74 kg/m²     Uncooperative therefore no exam was done      ASSESSMENT:  1. Late onset Alzheimer's disease with behavioral disturbance ([facility] [address]    2. Severe obesity ([facility] [address]    3. Primary osteoarthritis of both knees        PLAN:    1. Her behavior is extremely difficult. I will try her on Librium 10 mg t.i.d. to see if this reduces her agitation. I am somewhat leery about further increases in the Seroquel, which she is only taking 25 mg b.i.d.  2. Her obesity is about the same, difficult to measure because she will not cooperate. 3. Osteoarthritic knees are unchanged. Ideally the patient might need to see a psychiatrist.      .  Orders Placed This Encounter    chlordiazePOXIDE (LIBRIUM) 10 mg capsule       Follow-up and Dispositions    · Return in about 4 months (around 5/21/2020).            Erna Rodríguez MD

## 2020-02-12 DIAGNOSIS — N39.0 URINARY TRACT INFECTION WITHOUT HEMATURIA, SITE UNSPECIFIED: Primary | ICD-10-CM

## 2020-02-12 RX ORDER — CEFUROXIME AXETIL 500 MG/1
500 TABLET ORAL 2 TIMES DAILY
Qty: 14 TAB | Refills: 0 | Status: SHIPPED | OUTPATIENT
Start: 2020-02-12 | End: 2020-04-28 | Stop reason: CLARIF

## 2020-02-19 DIAGNOSIS — G30.1 LATE ONSET ALZHEIMER'S DISEASE WITH BEHAVIORAL DISTURBANCE (HCC): ICD-10-CM

## 2020-02-19 DIAGNOSIS — F02.818 LATE ONSET ALZHEIMER'S DISEASE WITH BEHAVIORAL DISTURBANCE (HCC): ICD-10-CM

## 2020-02-19 RX ORDER — QUETIAPINE FUMARATE 50 MG/1
TABLET, FILM COATED ORAL
Qty: 90 TAB | Refills: 5 | Status: SHIPPED | OUTPATIENT
Start: 2020-02-19 | End: 2020-07-10 | Stop reason: SDUPTHER

## 2020-04-28 RX ORDER — CEFUROXIME AXETIL 500 MG/1
500 TABLET ORAL 2 TIMES DAILY
Qty: 14 TAB | Refills: 0 | Status: SHIPPED | OUTPATIENT
Start: 2020-04-28 | End: 2020-05-05

## 2020-05-02 RX ORDER — MELOXICAM 15 MG/1
15 TABLET ORAL DAILY
Qty: 30 TAB | Refills: 11 | Status: SHIPPED | OUTPATIENT
Start: 2020-05-02

## 2020-07-10 DIAGNOSIS — G30.1 LATE ONSET ALZHEIMER'S DISEASE WITH BEHAVIORAL DISTURBANCE (HCC): ICD-10-CM

## 2020-07-10 DIAGNOSIS — F02.818 LATE ONSET ALZHEIMER'S DISEASE WITH BEHAVIORAL DISTURBANCE (HCC): ICD-10-CM

## 2020-07-11 RX ORDER — QUETIAPINE FUMARATE 50 MG/1
TABLET, FILM COATED ORAL
Qty: 90 TAB | Refills: 5 | Status: SHIPPED | OUTPATIENT
Start: 2020-07-11 | End: 2020-12-07

## 2020-07-28 ENCOUNTER — VIRTUAL VISIT (OUTPATIENT)
Dept: INTERNAL MEDICINE CLINIC | Age: 80
End: 2020-07-28

## 2020-07-28 DIAGNOSIS — I87.2 VENOUS INSUFFICIENCY: ICD-10-CM

## 2020-07-28 DIAGNOSIS — F02.818 LATE ONSET ALZHEIMER'S DISEASE WITH BEHAVIORAL DISTURBANCE (HCC): Primary | ICD-10-CM

## 2020-07-28 DIAGNOSIS — M17.0 PRIMARY OSTEOARTHRITIS OF BOTH KNEES: ICD-10-CM

## 2020-07-28 DIAGNOSIS — R63.4 WEIGHT LOSS, ABNORMAL: ICD-10-CM

## 2020-07-28 DIAGNOSIS — G30.1 LATE ONSET ALZHEIMER'S DISEASE WITH BEHAVIORAL DISTURBANCE (HCC): Primary | ICD-10-CM

## 2020-07-28 RX ORDER — DONEPEZIL HYDROCHLORIDE 10 MG/1
10 TABLET, FILM COATED ORAL
COMMUNITY
Start: 2020-05-01

## 2020-07-28 NOTE — PROGRESS NOTES
Chief Complaint   Patient presents with    Dementia     6 month follow up      1. Have you been to the ER, urgent care clinic since your last visit? Hospitalized since your last visit? No    2. Have you seen or consulted any other health care providers outside of the [address] [place] [address] since your last visit? Include any pap smears or colon screening.  No

## 2020-07-29 NOTE — PROGRESS NOTES
Armand Gallardo is a 70 y.o. female who was seen by synchronous (real-time) audio-video technology on 7/28/2020 for Dementia (6 month follow up )        Assessment & Plan:   Diagnoses and all orders for this visit:    1. Late onset Alzheimer's disease with behavioral disturbance ([address]    2. Primary osteoarthritis of both knees    3. Venous insufficiency    4. Weight loss, abnormal      1. The patient's Alzheimer's disease is getting progressively worse. She has reached the stage now where her caloric intake has decreased significantly, leading to weight reduction. Continue supportive measures. 2. Her osteoarthritic knees are reasonably stable in view of the weight loss that has occurred, to the point where she is a bit more ambulatory. 3. Her venous insufficiency is actually improved. 4. As far as weight loss is concerned, I think it is abnormal, related more to the dementia, although there could be another underlying pathological reason for this. This is partially discussed with her daughter. Subjective:   Patient is seen on telemedicine and all the conversation is with her daughter. The patient has severe dementia and is unable to communicate. According to the daughter, her PO intake is significantly decreased, and as a result he has lost a considerable amount of weight, specifically almost 40 pounds. This will allow her to walk better than she previously had been. She, however, remains mostly confused, but is getting progressively worse. I discontinued the Aricept, not only because of the severity of her dementia, but because of the potential adverse cardiovascular effects with continued use of Seroquel. Prior to Admission medications    Medication Sig Start Date End Date Taking? Authorizing Provider   QUEtiapine (SEROquel) 50 mg tablet Take 1 tablet by mouth three times a day. 7/11/20  Yes Celestino Gleason MD   donepeziL (ARICEPT) 10 mg tablet Take 10 mg by mouth nightly.  5/1/20 Provider, Historical   meloxicam (MOBIC) 15 mg tablet Take 1 Tab by mouth daily. 5/2/20   Jn Angeles MD   chlordiazePOXIDE (LIBRIUM) 10 mg capsule Take 1 Cap by mouth three (3) times daily as needed for Anxiety. Max Daily Amount: 30 mg. 1/26/20   Jn Angeles MD   famotidine (PEPCID) 40 mg tablet Take 1 Tab by mouth daily. 7/19/19   Jn Angeles MD   polyethylene glycol (MIRALAX) 17 gram/dose powder Take 17 g by mouth daily. 10/24/17   Sanjana Lopez MD   ASPIRIN/ACETAMINOPHEN/CAFFEINE (EXCEDRIN MIGRAINE PO) Take 1 Tab by mouth as needed. Other, MD Gabby     Current Outpatient Medications   Medication Sig Dispense Refill    QUEtiapine (SEROquel) 50 mg tablet Take 1 tablet by mouth three times a day. 90 Tab 5    donepeziL (ARICEPT) 10 mg tablet Take 10 mg by mouth nightly.  meloxicam (MOBIC) 15 mg tablet Take 1 Tab by mouth daily. 30 Tab 11    chlordiazePOXIDE (LIBRIUM) 10 mg capsule Take 1 Cap by mouth three (3) times daily as needed for Anxiety. Max Daily Amount: 30 mg. 30 Cap 1    famotidine (PEPCID) 40 mg tablet Take 1 Tab by mouth daily. 30 Tab 11    polyethylene glycol (MIRALAX) 17 gram/dose powder Take 17 g by mouth daily. 289 g 0    ASPIRIN/ACETAMINOPHEN/CAFFEINE (EXCEDRIN MIGRAINE PO) Take 1 Tab by mouth as needed. No Known Allergies  Past Medical History:   Diagnosis Date    Anemia     Arthritis     Transient global amnesia     dx 4 years ago. Past Surgical History:   Procedure Laterality Date    HX CATARACT REMOVAL      Bilateral    HX CHOLECYSTECTOMY      HX HYSTERECTOMY      HX KNEE ARTHROSCOPY      Right     History reviewed. No pertinent family history. ROS: Unobtainable    Objective:   No flowsheet data found.      [INSTRUCTIONS:  \"[x]\" Indicates a positive item  \"[]\" Indicates a negative item  -- DELETE ALL ITEMS NOT EXAMINED]    Constitutional: [x] Appears well-developed and well-nourished [x] No apparent distress      [] Abnormal -     Mental status: [x] Alert and awake  [x] Oriented to person/place/time [x] Able to follow commands    [] Abnormal -     Eyes:   EOM    [x]  Normal    [] Abnormal -   Sclera  [x]  Normal    [] Abnormal -          Discharge [x]  None visible   [] Abnormal -     HENT: [x] Normocephalic, atraumatic  [] Abnormal -   [x] Mouth/Throat: Mucous membranes are moist    External Ears [x] Normal  [] Abnormal -    Neck: [x] No visualized mass [] Abnormal -     Pulmonary/Chest: [x] Respiratory effort normal   [x] No visualized signs of difficulty breathing or respiratory distress        [] Abnormal -      Musculoskeletal:   [x] Normal gait with no signs of ataxia         [x] Normal range of motion of neck        [] Abnormal -     Neurological:        [x] No Facial Asymmetry (Cranial nerve 7 motor function) (limited exam due to video visit)          [x] No gaze palsy        [] Abnormal -          Skin:        [x] No significant exanthematous lesions or discoloration noted on facial skin         [] Abnormal -            Psychiatric:       [x] Normal Affect [] Abnormal -        [x] No Hallucinations    Other pertinent observable physical exam findings:-        We discussed the expected course, resolution and complications of the diagnosis(es) in detail. Medication risks, benefits, costs, interactions, and alternatives were discussed as indicated. I advised her to contact the office if her condition worsens, changes or fails to improve as anticipated. She expressed understanding with the diagnosis(es) and plan. Brenda Stewart, who was evaluated through a patient-initiated, synchronous (real-time) audio-video encounter, and/or her healthcare decision maker, is aware that it is a billable service, with coverage as determined by her insurance carrier. She provided verbal consent to proceed: Yes, and patient identification was verified.  It was conducted pursuant to the emergency declaration under the [facility] Act, 46 waiver authority and the [employer] Act. A caregiver was present when appropriate. Ability to conduct physical exam was limited. I was at home. The patient was at home. Please note that this dictation was completed with "[employer] the computer voice recognition software. Quite often unanticipated grammatical, syntax, homophones, and other interpretive errors are inadvertently transcribed by the computer software. Please disregard these errors. Please excuse any errors that have escaped final proofreading. Thank you.     Alphonso Liu MD

## 2020-11-17 RX ORDER — CEFUROXIME AXETIL 500 MG/1
500 TABLET ORAL 2 TIMES DAILY
Qty: 14 TAB | Refills: 0 | Status: SHIPPED | OUTPATIENT
Start: 2020-11-17 | End: 2020-11-24

## 2020-12-07 DIAGNOSIS — G30.1 LATE ONSET ALZHEIMER'S DISEASE WITH BEHAVIORAL DISTURBANCE (HCC): ICD-10-CM

## 2020-12-07 DIAGNOSIS — F02.818 LATE ONSET ALZHEIMER'S DISEASE WITH BEHAVIORAL DISTURBANCE (HCC): ICD-10-CM

## 2020-12-07 RX ORDER — QUETIAPINE FUMARATE 50 MG/1
TABLET, FILM COATED ORAL
Qty: 90 TAB | Refills: 5 | Status: SHIPPED | OUTPATIENT
Start: 2020-12-07 | End: 2021-05-27

## 2021-05-27 DIAGNOSIS — G30.1 LATE ONSET ALZHEIMER'S DISEASE WITH BEHAVIORAL DISTURBANCE (HCC): ICD-10-CM

## 2021-05-27 DIAGNOSIS — F02.818 LATE ONSET ALZHEIMER'S DISEASE WITH BEHAVIORAL DISTURBANCE (HCC): ICD-10-CM

## 2021-05-27 RX ORDER — QUETIAPINE FUMARATE 50 MG/1
TABLET, FILM COATED ORAL
Qty: 90 TABLET | Refills: 5 | Status: SHIPPED | OUTPATIENT
Start: 2021-05-27

## 2022-03-18 PROBLEM — G30.1 LATE ONSET ALZHEIMER'S DISEASE WITH BEHAVIORAL DISTURBANCE (HCC): Status: ACTIVE | Noted: 2019-04-07

## 2022-03-18 PROBLEM — F02.818 LATE ONSET ALZHEIMER'S DISEASE WITH BEHAVIORAL DISTURBANCE (HCC): Status: ACTIVE | Noted: 2019-04-07

## 2022-03-19 PROBLEM — R63.4 WEIGHT LOSS, ABNORMAL: Status: ACTIVE | Noted: 2020-07-28

## 2022-03-19 PROBLEM — M17.0 PRIMARY OSTEOARTHRITIS OF BOTH KNEES: Status: ACTIVE | Noted: 2019-04-07

## 2022-03-19 PROBLEM — E66.01 SEVERE OBESITY (HCC): Status: ACTIVE | Noted: 2019-04-02

## 2022-03-19 PROBLEM — I87.2 VENOUS INSUFFICIENCY: Status: ACTIVE | Noted: 2019-04-07

## 2023-05-23 RX ORDER — POLYETHYLENE GLYCOL 3350 17 G/17G
17 POWDER, FOR SOLUTION ORAL DAILY
COMMUNITY
Start: 2017-10-24

## 2023-05-23 RX ORDER — QUETIAPINE FUMARATE 50 MG/1
1 TABLET, FILM COATED ORAL 3 TIMES DAILY
COMMUNITY
Start: 2021-05-27

## 2023-05-23 RX ORDER — MELOXICAM 15 MG/1
15 TABLET ORAL DAILY
COMMUNITY
Start: 2020-05-02

## 2023-05-23 RX ORDER — CHLORDIAZEPOXIDE HYDROCHLORIDE 10 MG/1
10 CAPSULE, GELATIN COATED ORAL 3 TIMES DAILY PRN
COMMUNITY
Start: 2020-01-26

## 2023-05-23 RX ORDER — FAMOTIDINE 40 MG/1
40 TABLET, FILM COATED ORAL DAILY
COMMUNITY
Start: 2019-07-19

## 2023-05-23 RX ORDER — DONEPEZIL HYDROCHLORIDE 10 MG/1
10 TABLET, FILM COATED ORAL NIGHTLY
COMMUNITY
Start: 2020-05-01